# Patient Record
Sex: MALE | Race: WHITE | Employment: FULL TIME | URBAN - METROPOLITAN AREA
[De-identification: names, ages, dates, MRNs, and addresses within clinical notes are randomized per-mention and may not be internally consistent; named-entity substitution may affect disease eponyms.]

---

## 2017-01-31 ENCOUNTER — OFFICE VISIT (OUTPATIENT)
Dept: FAMILY MEDICINE CLINIC | Age: 27
End: 2017-01-31

## 2017-01-31 VITALS
BODY MASS INDEX: 25.95 KG/M2 | DIASTOLIC BLOOD PRESSURE: 64 MMHG | OXYGEN SATURATION: 99 % | WEIGHT: 194 LBS | HEART RATE: 71 BPM | SYSTOLIC BLOOD PRESSURE: 124 MMHG

## 2017-01-31 DIAGNOSIS — F32.A DEPRESSION, UNSPECIFIED DEPRESSION TYPE: Primary | ICD-10-CM

## 2017-01-31 PROCEDURE — 99214 OFFICE O/P EST MOD 30 MIN: CPT | Performed by: FAMILY MEDICINE

## 2017-01-31 RX ORDER — FLUOXETINE HYDROCHLORIDE 20 MG/1
20 CAPSULE ORAL DAILY
Qty: 30 CAPSULE | Refills: 3 | Status: SHIPPED | OUTPATIENT
Start: 2017-01-31 | End: 2018-03-20

## 2017-02-06 ENCOUNTER — OFFICE VISIT (OUTPATIENT)
Dept: PSYCHOLOGY | Age: 27
End: 2017-02-06

## 2017-02-06 DIAGNOSIS — F32.A DEPRESSION, UNSPECIFIED DEPRESSION TYPE: ICD-10-CM

## 2017-02-06 DIAGNOSIS — F41.9 ANXIETY: Primary | ICD-10-CM

## 2017-02-06 PROCEDURE — 90791 PSYCH DIAGNOSTIC EVALUATION: CPT | Performed by: PSYCHOLOGIST

## 2017-02-06 ASSESSMENT — PATIENT HEALTH QUESTIONNAIRE - PHQ9
SUM OF ALL RESPONSES TO PHQ QUESTIONS 1-9: 13
SUM OF ALL RESPONSES TO PHQ9 QUESTIONS 1 & 2: 6
5. POOR APPETITE OR OVEREATING: 0
6. FEELING BAD ABOUT YOURSELF - OR THAT YOU ARE A FAILURE OR HAVE LET YOURSELF OR YOUR FAMILY DOWN: 1
2. FEELING DOWN, DEPRESSED OR HOPELESS: 3
3. TROUBLE FALLING OR STAYING ASLEEP: 0
8. MOVING OR SPEAKING SO SLOWLY THAT OTHER PEOPLE COULD HAVE NOTICED. OR THE OPPOSITE, BEING SO FIGETY OR RESTLESS THAT YOU HAVE BEEN MOVING AROUND A LOT MORE THAN USUAL: 0
9. THOUGHTS THAT YOU WOULD BE BETTER OFF DEAD, OR OF HURTING YOURSELF: 0
1. LITTLE INTEREST OR PLEASURE IN DOING THINGS: 3
7. TROUBLE CONCENTRATING ON THINGS, SUCH AS READING THE NEWSPAPER OR WATCHING TELEVISION: 3
4. FEELING TIRED OR HAVING LITTLE ENERGY: 3

## 2017-02-20 ENCOUNTER — OFFICE VISIT (OUTPATIENT)
Dept: PSYCHOLOGY | Age: 27
End: 2017-02-20

## 2017-02-20 DIAGNOSIS — F32.A DEPRESSION, UNSPECIFIED DEPRESSION TYPE: Primary | ICD-10-CM

## 2017-02-20 DIAGNOSIS — F41.9 ANXIETY: ICD-10-CM

## 2017-02-20 PROCEDURE — 90832 PSYTX W PT 30 MINUTES: CPT | Performed by: PSYCHOLOGIST

## 2017-03-01 ENCOUNTER — TELEPHONE (OUTPATIENT)
Dept: FAMILY MEDICINE CLINIC | Age: 27
End: 2017-03-01

## 2017-03-08 ENCOUNTER — OFFICE VISIT (OUTPATIENT)
Dept: PSYCHOLOGY | Age: 27
End: 2017-03-08

## 2017-03-08 DIAGNOSIS — F41.9 ANXIETY: ICD-10-CM

## 2017-03-08 DIAGNOSIS — F32.A DEPRESSION, UNSPECIFIED DEPRESSION TYPE: Primary | ICD-10-CM

## 2017-03-08 PROCEDURE — 90832 PSYTX W PT 30 MINUTES: CPT | Performed by: PSYCHOLOGIST

## 2017-03-08 ASSESSMENT — PATIENT HEALTH QUESTIONNAIRE - PHQ9
8. MOVING OR SPEAKING SO SLOWLY THAT OTHER PEOPLE COULD HAVE NOTICED. OR THE OPPOSITE, BEING SO FIGETY OR RESTLESS THAT YOU HAVE BEEN MOVING AROUND A LOT MORE THAN USUAL: 0
6. FEELING BAD ABOUT YOURSELF - OR THAT YOU ARE A FAILURE OR HAVE LET YOURSELF OR YOUR FAMILY DOWN: 0
4. FEELING TIRED OR HAVING LITTLE ENERGY: 1
2. FEELING DOWN, DEPRESSED OR HOPELESS: 0
SUM OF ALL RESPONSES TO PHQ9 QUESTIONS 1 & 2: 0
5. POOR APPETITE OR OVEREATING: 0
1. LITTLE INTEREST OR PLEASURE IN DOING THINGS: 0
9. THOUGHTS THAT YOU WOULD BE BETTER OFF DEAD, OR OF HURTING YOURSELF: 0
7. TROUBLE CONCENTRATING ON THINGS, SUCH AS READING THE NEWSPAPER OR WATCHING TELEVISION: 1
3. TROUBLE FALLING OR STAYING ASLEEP: 1
SUM OF ALL RESPONSES TO PHQ QUESTIONS 1-9: 3

## 2018-03-20 ENCOUNTER — OFFICE VISIT (OUTPATIENT)
Dept: FAMILY MEDICINE CLINIC | Age: 28
End: 2018-03-20

## 2018-03-20 VITALS
HEIGHT: 73 IN | OXYGEN SATURATION: 99 % | BODY MASS INDEX: 25.31 KG/M2 | WEIGHT: 191 LBS | DIASTOLIC BLOOD PRESSURE: 54 MMHG | HEART RATE: 65 BPM | SYSTOLIC BLOOD PRESSURE: 100 MMHG

## 2018-03-20 DIAGNOSIS — F41.9 ANXIETY: Primary | ICD-10-CM

## 2018-03-20 PROCEDURE — 99213 OFFICE O/P EST LOW 20 MIN: CPT | Performed by: FAMILY MEDICINE

## 2018-03-20 RX ORDER — ESCITALOPRAM OXALATE 10 MG/1
10 TABLET ORAL DAILY
Qty: 30 TABLET | Refills: 3 | Status: SHIPPED | OUTPATIENT
Start: 2018-03-20 | End: 2018-05-30 | Stop reason: ALTCHOICE

## 2018-05-30 ENCOUNTER — HOSPITAL ENCOUNTER (OUTPATIENT)
Dept: OTHER | Age: 28
Discharge: HOME | End: 2018-05-31
Attending: FAMILY MEDICINE | Admitting: FAMILY MEDICINE

## 2018-05-30 ENCOUNTER — HOSPITAL ENCOUNTER (OUTPATIENT)
Dept: GENERAL RADIOLOGY | Age: 28
Discharge: HOME | End: 2018-05-30

## 2018-05-30 ENCOUNTER — OFFICE VISIT (OUTPATIENT)
Dept: FAMILY MEDICINE CLINIC | Age: 28
End: 2018-05-30

## 2018-05-30 VITALS
OXYGEN SATURATION: 99 % | HEART RATE: 95 BPM | RESPIRATION RATE: 16 BRPM | SYSTOLIC BLOOD PRESSURE: 104 MMHG | DIASTOLIC BLOOD PRESSURE: 70 MMHG | WEIGHT: 189 LBS | HEIGHT: 73 IN | BODY MASS INDEX: 25.05 KG/M2

## 2018-05-30 DIAGNOSIS — F41.0 PANIC ATTACKS: ICD-10-CM

## 2018-05-30 DIAGNOSIS — R07.9 CHEST PAIN, UNSPECIFIED TYPE: ICD-10-CM

## 2018-05-30 DIAGNOSIS — R07.9 CHEST PAIN, UNSPECIFIED TYPE: Primary | ICD-10-CM

## 2018-05-30 PROCEDURE — 93000 ELECTROCARDIOGRAM COMPLETE: CPT | Performed by: FAMILY MEDICINE

## 2018-05-30 PROCEDURE — 99214 OFFICE O/P EST MOD 30 MIN: CPT | Performed by: FAMILY MEDICINE

## 2018-05-30 RX ORDER — HYDROXYZINE HYDROCHLORIDE 10 MG/1
10 TABLET, FILM COATED ORAL 3 TIMES DAILY PRN
Qty: 30 TABLET | Refills: 0 | Status: SHIPPED | OUTPATIENT
Start: 2018-05-30 | End: 2018-06-09

## 2018-05-30 ASSESSMENT — PATIENT HEALTH QUESTIONNAIRE - PHQ9
SUM OF ALL RESPONSES TO PHQ9 QUESTIONS 1 & 2: 0
1. LITTLE INTEREST OR PLEASURE IN DOING THINGS: 0
SUM OF ALL RESPONSES TO PHQ QUESTIONS 1-9: 0
2. FEELING DOWN, DEPRESSED OR HOPELESS: 0

## 2018-05-30 ASSESSMENT — ENCOUNTER SYMPTOMS: SHORTNESS OF BREATH: 1

## 2018-10-31 ENCOUNTER — OFFICE VISIT (OUTPATIENT)
Dept: FAMILY MEDICINE CLINIC | Age: 28
End: 2018-10-31
Payer: COMMERCIAL

## 2018-10-31 VITALS
SYSTOLIC BLOOD PRESSURE: 116 MMHG | HEART RATE: 77 BPM | OXYGEN SATURATION: 99 % | DIASTOLIC BLOOD PRESSURE: 78 MMHG | WEIGHT: 186 LBS | BODY MASS INDEX: 24.88 KG/M2

## 2018-10-31 DIAGNOSIS — F41.9 ANXIETY: Primary | ICD-10-CM

## 2018-10-31 DIAGNOSIS — G47.9 SLEEP DISORDER: ICD-10-CM

## 2018-10-31 DIAGNOSIS — Z23 NEED FOR PROPHYLACTIC VACCINATION AND INOCULATION AGAINST INFLUENZA: ICD-10-CM

## 2018-10-31 PROCEDURE — 90471 IMMUNIZATION ADMIN: CPT | Performed by: FAMILY MEDICINE

## 2018-10-31 PROCEDURE — 99214 OFFICE O/P EST MOD 30 MIN: CPT | Performed by: FAMILY MEDICINE

## 2018-10-31 PROCEDURE — 90686 IIV4 VACC NO PRSV 0.5 ML IM: CPT | Performed by: FAMILY MEDICINE

## 2018-10-31 RX ORDER — ESCITALOPRAM OXALATE 10 MG/1
10 TABLET ORAL DAILY
Qty: 30 TABLET | Refills: 3 | Status: SHIPPED | OUTPATIENT
Start: 2018-10-31 | End: 2019-02-12 | Stop reason: SDUPTHER

## 2018-11-06 ENCOUNTER — OFFICE VISIT (OUTPATIENT)
Dept: PULMONOLOGY | Age: 28
End: 2018-11-06
Payer: COMMERCIAL

## 2018-11-06 VITALS
OXYGEN SATURATION: 98 % | TEMPERATURE: 97.9 F | HEIGHT: 73 IN | DIASTOLIC BLOOD PRESSURE: 59 MMHG | BODY MASS INDEX: 24.52 KG/M2 | WEIGHT: 185 LBS | HEART RATE: 66 BPM | RESPIRATION RATE: 16 BRPM | SYSTOLIC BLOOD PRESSURE: 95 MMHG

## 2018-11-06 DIAGNOSIS — G47.33 OSA (OBSTRUCTIVE SLEEP APNEA): Primary | ICD-10-CM

## 2018-11-06 DIAGNOSIS — G47.8 SLEEP PARALYSIS: ICD-10-CM

## 2018-11-06 DIAGNOSIS — G47.19 EXCESSIVE DAYTIME SLEEPINESS: ICD-10-CM

## 2018-11-06 PROCEDURE — 99244 OFF/OP CNSLTJ NEW/EST MOD 40: CPT | Performed by: INTERNAL MEDICINE

## 2018-11-06 ASSESSMENT — SLEEP AND FATIGUE QUESTIONNAIRES
HOW LIKELY ARE YOU TO NOD OFF OR FALL ASLEEP WHILE SITTING AND TALKING TO SOMEONE: 0
NECK CIRCUMFERENCE (INCHES): 15
HOW LIKELY ARE YOU TO NOD OFF OR FALL ASLEEP WHEN YOU ARE A PASSENGER IN A CAR FOR AN HOUR WITHOUT A BREAK: 1
HOW LIKELY ARE YOU TO NOD OFF OR FALL ASLEEP WHILE LYING DOWN TO REST IN THE AFTERNOON WHEN CIRCUMSTANCES PERMIT: 3
HOW LIKELY ARE YOU TO NOD OFF OR FALL ASLEEP IN A CAR, WHILE STOPPED FOR A FEW MINUTES IN TRAFFIC: 0
HOW LIKELY ARE YOU TO NOD OFF OR FALL ASLEEP WHILE SITTING INACTIVE IN A PUBLIC PLACE: 1
HOW LIKELY ARE YOU TO NOD OFF OR FALL ASLEEP WHILE SITTING AND READING: 3
ESS TOTAL SCORE: 13
HOW LIKELY ARE YOU TO NOD OFF OR FALL ASLEEP WHILE SITTING QUIETLY AFTER LUNCH WITHOUT ALCOHOL: 2
HOW LIKELY ARE YOU TO NOD OFF OR FALL ASLEEP WHILE WATCHING TV: 3

## 2018-11-06 NOTE — PROGRESS NOTES
injection. ENT: No discharge. Pharynx clear. External appearance of ears and nose normal. Mallampati II, relatively  Neck: Trachea midline. No obvious mass. No JVD  Resp: No accessory muscle use. No crackles. No wheezes. No rhonchi. CV: Regular rate. Regular rhythm. No murmur or rub. No edema. GI: Non-tender. Non-distended. Skin: Warm, dry, normal texture and turgor. No nodule on exposed extremities. Lymph: No cervical LAD. No supraclavicular LAD. M/S: No cyanosis. No clubbing. No joint deformity. Neuro: Moves all four extremities. Psych: Oriented x 3. No anxiety. Awake. Alert. Intact judgement and insight. Current Outpatient Prescriptions   Medication Sig Dispense Refill    escitalopram (LEXAPRO) 10 MG tablet Take 1 tablet by mouth daily 30 tablet 3     No current facility-administered medications for this visit. Data Reviewed:   CBC and Renal reviewed  Last CBC  Lab Results   Component Value Date    WBC 6.2 09/09/2015    RBC 5.15 09/09/2015    HGB 15.2 09/09/2015    MCV 87.2 09/09/2015     09/09/2015     Last Renal  Lab Results   Component Value Date     08/11/2016    K 4.3 08/11/2016    CL 99 08/11/2016    CO2 24 08/11/2016    CO2 28 09/22/2015    CO2 24 02/26/2015    BUN 13 08/11/2016    CREATININE 1.0 08/11/2016    GLUCOSE 93 08/11/2016    CALCIUM 9.4 08/11/2016         Assessment:     · CRISTIAN  · Excessive daytime sleepiness  · Sleep paralysis    Plan:      1. CRISTIAN (obstructive sleep apnea)  Given snoring, observed apneas and waking up with a startle, he could have CRISTIAN. I would like to start with a home sleep study, explained the rationale to the patient. - Home Sleep Study; Future    2. Excessive daytime sleepiness  Probably related to CRISTIAN, cannot rule out narcolepsy. Await results of home sleep study. 3. Sleep paralysis  Long-standing, intermittent. This likely to represent narcolepsy, but if sleep study is negative for CRISTIAN, he will need a NPSG followed by MSLT.

## 2018-11-06 NOTE — PATIENT INSTRUCTIONS
Please  sleep machine at [facility]ULT[facility] on 11/12/2018 around 9 am.    [address] 13 2351 [facility] 22[facility], 5401 [facility]   [facility] [phone] [address] [facility] [address] patient ([address]) [phone]  [address], [address]   [facility]       ([address]) [phone] 107 [facility]  [place], [address]   ATI Cpap [phone] [facility]  [facility]   [address] [phone] [address], [address]   [address] [phone] or  [address], [address]   [facility] 1025 [facility]. [facility]   [place]  ([address]) [phone] or  [address], 4462 [facility] 1520 [facility]  ([address]) 1229 C [facility], [facility] Will 10   [facility] [phone] opt4 opt2    [facility] [phone]    IEWWZBH MWOAKWJTFB [phone] [facility] 43, [address] [facility] [address] [place] [phone] [address] [place] [address] 27   [address] [phone] [facility], [address] [facility] [address]   [facility] [place] [phone] [address] [place]   [address]  ([address] [phone]  1102 [facility] [address], [address]   515 [facility]  ([address]) [phone] [address]   1102 [facility], [facility] [phone] [address], [address]   [address]  (No Cpap machines) [facility], [facility]   [place] ([place] [address] place Ins) [phone] [address] [place] [address].   [place], [address]   [facility] [phone] [address] [phone] 5165 Paul [facility], [facility]0 [facility] [phone] [address] [place] [address] [facility] [address], [address] Respiratory [phone] 1256 [facility], [address] [facility] [phone] 69029 [facility],1St Floor  [place], [address], [address] ([address]) [phone] [address]  [place], [address]   [address]   [phone] 3788 [facility] [phone] 6000 [facility], [address]   [facility] (used to be Pt. [facility]) [phone] [address]  [facility], [address] [place] [address]   Patient Hardy Nicanor [phone] 5940 [facility].  [place] [place], [address]   701 [facility]  Respiratory [phone] 1668 [facility], [address] [place] [address]

## 2018-11-06 NOTE — LETTER
1200 [facility] Pulmonary Critical Care and Sleep  [address] [place] [address] Alf Yap  Phone: [phone]  Fax: [phone]    Sherine An MD        November 6, 2018       Patient: Umberto Garcia   MR Number: [id number]   YOB: 1990   Date of Visit: 11/6/2018       Dear Dr. Sweeney Ro: Thank you for the request for consultation for Christal Orozco to me for the evaluation of possible CRISTIAN. Below are the relevant portions of my assessment and plan of care. If you have questions, please do not hesitate to call me. I look forward to following Alejo Miranda along with you. Sincerely,        Ryan Bloch, MD    CC providers:  Canelo Johnson MD  5721 [facility].   [address] [place] [address]

## 2018-11-15 ENCOUNTER — OFFICE VISIT (OUTPATIENT)
Dept: FAMILY MEDICINE CLINIC | Age: 28
End: 2018-11-15
Payer: COMMERCIAL

## 2018-11-15 VITALS
BODY MASS INDEX: 25.21 KG/M2 | HEART RATE: 67 BPM | OXYGEN SATURATION: 98 % | DIASTOLIC BLOOD PRESSURE: 70 MMHG | WEIGHT: 190.2 LBS | SYSTOLIC BLOOD PRESSURE: 110 MMHG | HEIGHT: 73 IN | RESPIRATION RATE: 16 BRPM

## 2018-11-15 DIAGNOSIS — K21.9 GASTROESOPHAGEAL REFLUX DISEASE, ESOPHAGITIS PRESENCE NOT SPECIFIED: ICD-10-CM

## 2018-11-15 DIAGNOSIS — R30.0 BURNING WITH URINATION: Primary | ICD-10-CM

## 2018-11-15 LAB
BILIRUBIN URINE: NEGATIVE
BILIRUBIN, POC: NEGATIVE
BLOOD URINE, POC: NORMAL
BLOOD, URINE: ABNORMAL
CLARITY, POC: CLEAR
CLARITY: CLEAR
COLOR, POC: YELLOW
COLOR: YELLOW
EPITHELIAL CELLS, UA: 0 /HPF (ref 0–5)
GLUCOSE URINE, POC: NEGATIVE
GLUCOSE URINE: NEGATIVE MG/DL
HYALINE CASTS: 0 /LPF (ref 0–8)
KETONES, POC: NEGATIVE
KETONES, URINE: NEGATIVE MG/DL
LEUKOCYTE EST, POC: NEGATIVE
LEUKOCYTE ESTERASE, URINE: NEGATIVE
MICROSCOPIC EXAMINATION: YES
NITRITE, POC: NEGATIVE
NITRITE, URINE: NEGATIVE
PH UA: 6.5
PH, POC: 6.5
PROTEIN UA: NEGATIVE MG/DL
PROTEIN, POC: NEGATIVE
RBC UA: 1 /HPF (ref 0–4)
SPECIFIC GRAVITY UA: 1.01
SPECIFIC GRAVITY, POC: 1.01
URINE REFLEX TO CULTURE: ABNORMAL
URINE TYPE: ABNORMAL
UROBILINOGEN, POC: 0.2
UROBILINOGEN, URINE: 0.2 E.U./DL
WBC UA: 0 /HPF (ref 0–5)

## 2018-11-15 PROCEDURE — 99214 OFFICE O/P EST MOD 30 MIN: CPT | Performed by: NURSE PRACTITIONER

## 2018-11-15 PROCEDURE — 81003 URINALYSIS AUTO W/O SCOPE: CPT | Performed by: NURSE PRACTITIONER

## 2018-11-15 PROCEDURE — 81002 URINALYSIS NONAUTO W/O SCOPE: CPT | Performed by: NURSE PRACTITIONER

## 2018-11-15 RX ORDER — OMEPRAZOLE 20 MG/1
20 CAPSULE, DELAYED RELEASE ORAL DAILY
Qty: 30 CAPSULE | Refills: 1 | Status: SHIPPED | OUTPATIENT
Start: 2018-11-15 | End: 2019-04-13 | Stop reason: ALTCHOICE

## 2018-11-15 ASSESSMENT — ENCOUNTER SYMPTOMS
SHORTNESS OF BREATH: 0
COUGH: 0

## 2018-11-21 ENCOUNTER — TELEPHONE (OUTPATIENT)
Dept: PULMONOLOGY | Age: 28
End: 2018-11-21

## 2018-11-30 ENCOUNTER — TELEPHONE (OUTPATIENT)
Dept: PULMONOLOGY | Age: 28
End: 2018-11-30

## 2019-02-12 ENCOUNTER — OFFICE VISIT (OUTPATIENT)
Dept: FAMILY MEDICINE CLINIC | Age: 29
End: 2019-02-12
Payer: COMMERCIAL

## 2019-02-12 VITALS
SYSTOLIC BLOOD PRESSURE: 100 MMHG | TEMPERATURE: 98.4 F | WEIGHT: 195 LBS | DIASTOLIC BLOOD PRESSURE: 60 MMHG | BODY MASS INDEX: 25.73 KG/M2 | HEART RATE: 80 BPM

## 2019-02-12 DIAGNOSIS — F41.9 ANXIETY: ICD-10-CM

## 2019-02-12 DIAGNOSIS — H92.01 RIGHT EAR PAIN: Primary | ICD-10-CM

## 2019-02-12 PROCEDURE — G0444 DEPRESSION SCREEN ANNUAL: HCPCS | Performed by: NURSE PRACTITIONER

## 2019-02-12 PROCEDURE — 99213 OFFICE O/P EST LOW 20 MIN: CPT | Performed by: NURSE PRACTITIONER

## 2019-02-12 RX ORDER — ESCITALOPRAM OXALATE 10 MG/1
10 TABLET ORAL DAILY
Qty: 30 TABLET | Refills: 3 | Status: ON HOLD | OUTPATIENT
Start: 2019-02-12 | End: 2019-04-15 | Stop reason: HOSPADM

## 2019-02-12 RX ORDER — FLUTICASONE PROPIONATE 50 MCG
1 SPRAY, SUSPENSION (ML) NASAL DAILY
Qty: 2 BOTTLE | Refills: 1 | Status: SHIPPED | OUTPATIENT
Start: 2019-02-12 | End: 2019-04-13 | Stop reason: ALTCHOICE

## 2019-02-12 ASSESSMENT — PATIENT HEALTH QUESTIONNAIRE - PHQ9
4. FEELING TIRED OR HAVING LITTLE ENERGY: 3
7. TROUBLE CONCENTRATING ON THINGS, SUCH AS READING THE NEWSPAPER OR WATCHING TELEVISION: 2
3. TROUBLE FALLING OR STAYING ASLEEP: 3
1. LITTLE INTEREST OR PLEASURE IN DOING THINGS: 2
SUM OF ALL RESPONSES TO PHQ QUESTIONS 1-9: 18
5. POOR APPETITE OR OVEREATING: 2
10. IF YOU CHECKED OFF ANY PROBLEMS, HOW DIFFICULT HAVE THESE PROBLEMS MADE IT FOR YOU TO DO YOUR WORK, TAKE CARE OF THINGS AT HOME, OR GET ALONG WITH OTHER PEOPLE: 3
6. FEELING BAD ABOUT YOURSELF - OR THAT YOU ARE A FAILURE OR HAVE LET YOURSELF OR YOUR FAMILY DOWN: 3
2. FEELING DOWN, DEPRESSED OR HOPELESS: 3
8. MOVING OR SPEAKING SO SLOWLY THAT OTHER PEOPLE COULD HAVE NOTICED. OR THE OPPOSITE, BEING SO FIGETY OR RESTLESS THAT YOU HAVE BEEN MOVING AROUND A LOT MORE THAN USUAL: 0
SUM OF ALL RESPONSES TO PHQ QUESTIONS 1-9: 18
9. THOUGHTS THAT YOU WOULD BE BETTER OFF DEAD, OR OF HURTING YOURSELF: 0
SUM OF ALL RESPONSES TO PHQ9 QUESTIONS 1 & 2: 5

## 2019-02-12 ASSESSMENT — ENCOUNTER SYMPTOMS
SINUS PAIN: 0
SINUS PRESSURE: 0
VOICE CHANGE: 0
GASTROINTESTINAL NEGATIVE: 1
RESPIRATORY NEGATIVE: 1

## 2019-04-13 ENCOUNTER — APPOINTMENT (OUTPATIENT)
Dept: GENERAL RADIOLOGY | Age: 29
DRG: 885 | End: 2019-04-13
Payer: COMMERCIAL

## 2019-04-13 ENCOUNTER — HOSPITAL ENCOUNTER (INPATIENT)
Age: 29
LOS: 2 days | Discharge: HOME | DRG: 885 | End: 2019-04-15
Attending: EMERGENCY MEDICINE | Admitting: PSYCHIATRY & NEUROLOGY
Payer: COMMERCIAL

## 2019-04-13 DIAGNOSIS — F39 MOOD DISORDER (HCC): Primary | ICD-10-CM

## 2019-04-13 DIAGNOSIS — F31.12 BIPOLAR I DISORDER, MOST RECENT EPISODE (OR CURRENT) MANIC, MODERATE (HCC): ICD-10-CM

## 2019-04-13 DIAGNOSIS — M25.572 ACUTE LEFT ANKLE PAIN: ICD-10-CM

## 2019-04-13 LAB
A/G RATIO: 2 (ref 1.1–2.2)
ALBUMIN SERPL-MCNC: 4.7 G/DL (ref 3.4–5)
ALP BLD-CCNC: 74 U/L (ref 40–129)
ALT SERPL-CCNC: 35 U/L (ref 10–40)
AMPHETAMINE SCREEN, URINE: NORMAL
ANION GAP SERPL CALCULATED.3IONS-SCNC: 12 MMOL/L (ref 3–16)
AST SERPL-CCNC: 50 U/L (ref 15–37)
BARBITURATE SCREEN URINE: NORMAL
BASOPHILS ABSOLUTE: 0.1 K/UL (ref 0–0.2)
BASOPHILS RELATIVE PERCENT: 0.6 %
BENZODIAZEPINE SCREEN, URINE: NORMAL
BILIRUB SERPL-MCNC: 0.8 MG/DL (ref 0–1)
BUN BLDV-MCNC: 14 MG/DL (ref 7–20)
CALCIUM SERPL-MCNC: 9.2 MG/DL (ref 8.3–10.6)
CANNABINOID SCREEN URINE: NORMAL
CHLORIDE BLD-SCNC: 100 MMOL/L (ref 99–110)
CO2: 26 MMOL/L (ref 21–32)
COCAINE METABOLITE SCREEN URINE: NORMAL
CREAT SERPL-MCNC: 1.1 MG/DL (ref 0.9–1.3)
EOSINOPHILS ABSOLUTE: 0 K/UL (ref 0–0.6)
EOSINOPHILS RELATIVE PERCENT: 0.3 %
ETHANOL: NORMAL MG/DL (ref 0–0.08)
GFR AFRICAN AMERICAN: >60
GFR NON-AFRICAN AMERICAN: >60
GLOBULIN: 2.4 G/DL
GLUCOSE BLD-MCNC: 112 MG/DL (ref 70–99)
HCT VFR BLD CALC: 44.3 % (ref 40.5–52.5)
HEMOGLOBIN: 14.9 G/DL (ref 13.5–17.5)
LYMPHOCYTES ABSOLUTE: 1.4 K/UL (ref 1–5.1)
LYMPHOCYTES RELATIVE PERCENT: 16.2 %
Lab: NORMAL
MCH RBC QN AUTO: 29.7 PG (ref 26–34)
MCHC RBC AUTO-ENTMCNC: 33.6 G/DL (ref 31–36)
MCV RBC AUTO: 88.2 FL (ref 80–100)
METHADONE SCREEN, URINE: NORMAL
MONOCYTES ABSOLUTE: 0.6 K/UL (ref 0–1.3)
MONOCYTES RELATIVE PERCENT: 7.2 %
NEUTROPHILS ABSOLUTE: 6.4 K/UL (ref 1.7–7.7)
NEUTROPHILS RELATIVE PERCENT: 75.7 %
OPIATE SCREEN URINE: NORMAL
OXYCODONE URINE: NORMAL
PDW BLD-RTO: 14.1 % (ref 12.4–15.4)
PH UA: 6
PHENCYCLIDINE SCREEN URINE: NORMAL
PLATELET # BLD: 293 K/UL (ref 135–450)
PMV BLD AUTO: 7.3 FL (ref 5–10.5)
POTASSIUM SERPL-SCNC: 3.8 MMOL/L (ref 3.5–5.1)
PROPOXYPHENE SCREEN: NORMAL
RBC # BLD: 5.02 M/UL (ref 4.2–5.9)
SODIUM BLD-SCNC: 138 MMOL/L (ref 136–145)
TOTAL PROTEIN: 7.1 G/DL (ref 6.4–8.2)
WBC # BLD: 8.5 K/UL (ref 4–11)

## 2019-04-13 PROCEDURE — 73610 X-RAY EXAM OF ANKLE: CPT

## 2019-04-13 PROCEDURE — 80307 DRUG TEST PRSMV CHEM ANLYZR: CPT

## 2019-04-13 PROCEDURE — 99285 EMERGENCY DEPT VISIT HI MDM: CPT

## 2019-04-13 PROCEDURE — G0480 DRUG TEST DEF 1-7 CLASSES: HCPCS

## 2019-04-13 PROCEDURE — 1240000000 HC EMOTIONAL WELLNESS R&B

## 2019-04-13 PROCEDURE — 80053 COMPREHEN METABOLIC PANEL: CPT

## 2019-04-13 PROCEDURE — 85025 COMPLETE CBC W/AUTO DIFF WBC: CPT

## 2019-04-13 RX ORDER — MAGNESIUM HYDROXIDE/ALUMINUM HYDROXICE/SIMETHICONE 120; 1200; 1200 MG/30ML; MG/30ML; MG/30ML
30 SUSPENSION ORAL EVERY 6 HOURS PRN
Status: DISCONTINUED | OUTPATIENT
Start: 2019-04-13 | End: 2019-04-15 | Stop reason: HOSPADM

## 2019-04-13 RX ORDER — TRAZODONE HYDROCHLORIDE 50 MG/1
50 TABLET ORAL NIGHTLY PRN
Status: DISCONTINUED | OUTPATIENT
Start: 2019-04-13 | End: 2019-04-15 | Stop reason: HOSPADM

## 2019-04-13 RX ORDER — NICOTINE 21 MG/24HR
1 PATCH, TRANSDERMAL 24 HOURS TRANSDERMAL DAILY PRN
Status: DISCONTINUED | OUTPATIENT
Start: 2019-04-13 | End: 2019-04-15 | Stop reason: HOSPADM

## 2019-04-13 RX ORDER — ACETAMINOPHEN 325 MG/1
650 TABLET ORAL EVERY 4 HOURS PRN
Status: DISCONTINUED | OUTPATIENT
Start: 2019-04-13 | End: 2019-04-15 | Stop reason: HOSPADM

## 2019-04-13 RX ORDER — HYDROXYZINE PAMOATE 50 MG/1
50 CAPSULE ORAL 3 TIMES DAILY PRN
Status: DISCONTINUED | OUTPATIENT
Start: 2019-04-13 | End: 2019-04-15 | Stop reason: HOSPADM

## 2019-04-13 RX ORDER — OLANZAPINE 5 MG/1
5 TABLET ORAL 2 TIMES DAILY PRN
Status: DISCONTINUED | OUTPATIENT
Start: 2019-04-13 | End: 2019-04-15 | Stop reason: HOSPADM

## 2019-04-13 ASSESSMENT — LIFESTYLE VARIABLES: HISTORY_ALCOHOL_USE: NO

## 2019-04-13 ASSESSMENT — SLEEP AND FATIGUE QUESTIONNAIRES
DIFFICULTY ARISING: NO
SLEEP PATTERN: DIFFICULTY FALLING ASLEEP;DISTURBED/INTERRUPTED SLEEP
DO YOU HAVE DIFFICULTY SLEEPING: YES
DIFFICULTY STAYING ASLEEP: YES
DIFFICULTY FALLING ASLEEP: YES
RESTFUL SLEEP: NO
DO YOU USE A SLEEP AID: NO
AVERAGE NUMBER OF SLEEP HOURS: 4

## 2019-04-13 NOTE — ED NOTES
Admission questions completed. Need physical assessment when arrives to unit. Need belongings checked and orientation.       Sandra Joe RN  04/13/19 2158

## 2019-04-13 NOTE — ED NOTES
Security called to take patient to [facility].      Sumi Reynaga RN  04/13/19 [phone]

## 2019-04-13 NOTE — ED NOTES
Met with patient's wife Golden Corona who accompanied patient per patient permission. Wife reports concerns about patient abnormal behaviors. Reports he is not himself. Reports patient started showing signs of depression about a month ago and started having fleeting suicidal thoughts with thoughts of sitting in running car. Reports he has no intent that he mentioned. Reports he seen his primary doctor and was started on Lexapro 10 mg. Reports past couple weeks patient thought he was doing better and than started becoming hypermanic like. Reports he out of no where went and ran 6 miles with out training. Reports he has not been sleeping and has a decreased appetite and has lost about 10 lbs in 2 weeks. Reports he has been going out after work and staying out all night which is completely not like him. Reports him to be flirting with other girls which is not in his character. Reports they have a daughter and another baby on way in few weeks. Reports he usually is the best dad ever and he has not been coming home to watch her. Reports daughter is recovering from surgery and has not been there for her like he normally would be. Reports he has been drinking when he goes out a couple drinks a could nights a week. Reports does not believe him to be using drugs. Reports they went to one marriage therapy appointment and therapist told him he should be evaluated for bipolar. Wife reports patient is working full time as a manager at [employer]. Reports he has had issues with jobs through the years. Reports he has had depression since he was a teenager. Reports no known suicide attempts in past. Reports no known violence in past. Reports she does not believe he would harm himself however is concerned about him making poor choices. Reports she feels staying would benefit him. Spoke to her about IOP/PHP. Wife reports she feels would benefit him as well. Reports she wants him to get help.      Ya Pulido, RN  04/13/19 1024

## 2019-04-13 NOTE — ED PROVIDER NOTES
[address] ED      CHIEF COMPLAINT  Mental Health Problem (feel hypermanic, alot of energy, unable to sleep, fleeting suicidal thoughts)       HISTORY OF PRESENT ILLNESS  Rama Turner is a 29 y.o. male  who presents to the ED complaining of concerns for abnormal behavior. Patient had been recently placed on Lexapro. He has been going out which is unusual for him and went for a long run spontaneously as well which is not his typical routine. He has had very fleeting suicidal thoughts but no current suicidal ideation. Patient also states that last night he went out does not recall what he may have done but his left ankle on the lateral aspect is hurting whenever he tries to bear weight. Patient denies any numbness, tingling or weakness. No pain in the knee or hip. No known fall or injury. No other complaints, modifying factors or associated symptoms. I have reviewed the following from the nursing documentation. 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 No pertinent surgical history.   Family History   Problem Relation Age of Onset    High Blood Pressure Father     Depression Father     Diabetes Maternal Grandfather     Stroke Maternal Grandfather     High Cholesterol Maternal Grandfather     Heart Disease Paternal Grandfather     Thyroid Disease Paternal Grandfather     Depression Paternal Grandfather     Thyroid Disease Maternal Aunt     Depression Paternal Aunt     Allergies Paternal Grandmother     Cancer Paternal Grandmother         Ovarian     Social History     Socioeconomic History    Marital status:      Spouse name: Not on file    Number of children: Not on file    Years of education: Not on file    Highest education level: Not on file   Occupational History    Not on file   Social Needs    Financial resource strain: Not on file    Food insecurity:     Worry: Not on file Inability: Not on file    Transportation needs:     Medical: Not on file     Non-medical: Not on file   Tobacco Use    Smoking status: Never Smoker    Smokeless tobacco: Never Used   Substance and Sexual Activity    Alcohol use: Yes     Comment: 1-2 drinks occasionally    Drug use: No    Sexual activity: Yes     Partners: Female   Lifestyle    Physical activity:     Days per week: Not on file     Minutes per session: Not on file    Stress: Not on file   Relationships    Social connections:     Talks on phone: Not on file     Gets together: Not on file     Attends Yarsanism service: Not on file     Active member of club or organization: Not on file     Attends meetings of clubs or organizations: Not on file     Relationship status: Not on file    Intimate partner violence:     Fear of current or ex partner: Not on file     Emotionally abused: Not on file     Physically abused: Not on file     Forced sexual activity: Not on file   Other Topics Concern    Not on file   Social History Narrative    Not on file     No current facility-administered medications for this encounter. Current Outpatient Medications   Medication Sig Dispense Refill    escitalopram (LEXAPRO) 10 MG tablet Take 1 tablet by mouth daily 30 tablet 3     No Known Allergies    REVIEW OF SYSTEMS  10 systems reviewed, pertinent positives per HPI otherwise noted to be negative. PHYSICAL EXAM  /84   Pulse 97   Temp 97.5 °F (36.4 °C) (Oral)   Resp 16   Ht 6' 1\" (1.854 m)   Wt 185 lb (83.9 kg)   SpO2 99%   BMI 24.41 kg/m²    GENERAL APPEARANCE: Awake and alert. Cooperative. No acute distress. HENT: Normocephalic. Atraumatic. Mucous membranes are moist.  No drooling or stridor. NECK: Supple. EYES: PERRL. EOM's grossly intact. HEART/CHEST: RRR. No murmurs. 2+ DP and PT pulses bilaterally. LUNGS: Respirations unlabored. CTAB. Good air exchange. Speaking comfortably in full sentences. ABDOMEN: No tenderness. Soft. Non-distended. No masses. No organomegaly. No guarding or rebound. MUSCULOSKELETAL: No extremity edema. Compartments soft. No deformity. Mild tenderness the left ankle just inferior to the lateral malleolus but no actual point tenderness over the medial or lateral malleoli. No significant soft tissue swelling, warmth or erythema. .  All extremities neurovascularly intact. SKIN: Warm and dry. No acute rashes. NEUROLOGICAL: Alert and oriented. CN's 2-12 intact. No gross facial drooping. Strength 5/5, sensation intact. PSYCHIATRIC: Normal mood and affect. LABS  I have reviewed all labs for this visit.    Results for orders placed or performed during the hospital encounter of 04/13/19   CBC auto differential   Result Value Ref Range    WBC 8.5 4.0 - 11.0 K/uL    RBC 5.02 4.20 - 5.90 M/uL    Hemoglobin 14.9 13.5 - 17.5 g/dL    Hematocrit 44.3 40.5 - 52.5 %    MCV 88.2 80.0 - 100.0 fL    MCH 29.7 26.0 - 34.0 pg    MCHC 33.6 31.0 - 36.0 g/dL    RDW 14.1 12.4 - 15.4 %    Platelets 112 808 - 605 K/uL    MPV 7.3 5.0 - 10.5 fL    Neutrophils % 75.7 %    Lymphocytes % 16.2 %    Monocytes % 7.2 %    Eosinophils % 0.3 %    Basophils % 0.6 %    Neutrophils # 6.4 1.7 - 7.7 K/uL    Lymphocytes # 1.4 1.0 - 5.1 K/uL    Monocytes # 0.6 0.0 - 1.3 K/uL    Eosinophils # 0.0 0.0 - 0.6 K/uL    Basophils # 0.1 0.0 - 0.2 K/uL   Comprehensive metabolic panel   Result Value Ref Range    Sodium 138 136 - 145 mmol/L    Potassium 3.8 3.5 - 5.1 mmol/L    Chloride 100 99 - 110 mmol/L    CO2 26 21 - 32 mmol/L    Anion Gap 12 3 - 16    Glucose 112 (H) 70 - 99 mg/dL    BUN 14 7 - 20 mg/dL    CREATININE 1.1 0.9 - 1.3 mg/dL    GFR Non-African American >60 >60    GFR African American >60 >60    Calcium 9.2 8.3 - 10.6 mg/dL    Total Protein 7.1 6.4 - 8.2 g/dL    Alb 4.7 3.4 - 5.0 g/dL    Albumin/Globulin Ratio 2.0 1.1 - 2.2    Total Bilirubin 0.8 0.0 - 1.0 mg/dL    Alkaline Phosphatase 74 40 - 129 U/L    ALT 35 10 - 40 U/L    AST 50 (H) 15 - 37 U/L    Globulin 2.4 g/dL   Drug screen multi urine   Result Value Ref Range    Amphetamine Screen, Urine Neg Negative <1000ng/mL    Barbiturate Screen, Ur Neg Negative <200 ng/mL    Benzodiazepine Screen, Urine Neg Negative <200 ng/mL    Cannabinoid Scrn, Ur Neg Negative <50 ng/mL    Cocaine Metabolite Screen, Urine Neg Negative <300 ng/mL    Opiate Scrn, Ur Neg Negative <300 ng/mL    PCP Screen, Urine Neg Negative <25 ng/mL    Methadone Screen, Urine Neg Negative <300 ng/mL    Propoxyphene Scrn, Ur Neg Negative <300 ng/mL    pH, UA 6.0     Drug Screen Comment: see below     Oxycodone Urine Neg Negative <100 ng/ml   Ethanol   Result Value Ref Range    Ethanol Lvl None Detected mg/dL       RADIOLOGY  Xr Ankle Left (min 3 Views)    Result Date: 4/13/2019  EXAMINATION: 3 XRAY VIEWS OF THE LEFT ANKLE 4/13/2019 10:33 am COMPARISON: None. HISTORY: ORDERING SYSTEM PROVIDED HISTORY: pain with walking, no known injury. pain is lateral TECHNOLOGIST PROVIDED HISTORY: Reason for exam:->pain with walking, no known injury. pain is lateral Ordering Physician Provided Reason for Exam: pain in lateral malleolus Acuity: Acute Type of Exam: Initial FINDINGS: Talar dome normal in contour. Ankle mortise is preserved. No evidence of acute fracture or malalignment. No acute osseous abnormality. ED COURSE/MDM  Patient seen and evaluated. Old records reviewed. Labs and imaging reviewed and results discussed with patient. Patient presenting for evaluation of unusual behaviors that are spontaneous and excessive. He also is complaining of left ankle pain. He is neurovascular intact. Plain films negative for any acute fracture. I have performed a medical clearance examination on this patient. It is my opinion that no medical conditions were discovered that would preclude admission to a behavioral health unit or discharge home.   I feel that the patient is medically stable for disposition by the behavioral health team at this time. At this time patient has been evaluated by the behavioral access team and has offered voluntary admission to the behavioral unit versus PHP/IOP. At this time patient would like to be admitted voluntarily and will be admitted for further behavioral management at this time. During the patient's ED course, the patient was given:  Medications - No data to display     CLINICAL IMPRESSION  1. Mood disorder ([address]    2. Acute left ankle pain        Blood pressure 124/84, pulse 97, temperature 97.5 °F (36.4 °C), temperature source Oral, resp. rate 16, height 6' 1\" (1.854 m), weight 185 lb (83.9 kg), SpO2 99 %. [address] 649 V was admitted in stable condition. DISCLAIMER: This chart was created using Dragon dictation software. Efforts were made by me to ensure accuracy, however some errors may be present due to limitations of this technology and occasionally words are not transcribed correctly.         Chapin Rodriguez MD  04/13/19 8702

## 2019-04-13 NOTE — ED NOTES
Pt arrived ambulatory to [facility] with ED tech. Pt cooperative with belongings exchange, urine specimen collection and vitals collection. Pt belongings placed in locker and pt roomed in B3. No further needs at this time. Will continue to monitor for safety.     JUD Priest

## 2019-04-13 NOTE — BH NOTE
`[facility] [place]  Admission Note     Admission Type:   Admission Type: Voluntary    Reason for admission:  Reason for Admission: Suicidal thoughts, depresssion, trouble sleeping, decreased appetite,weight loss    PATIENT STRENGTHS:  Strengths: Communication, Positive Support, Social Skills, Employment, Medication Compliance, No significant Physical Illness    Patient Strengths and Limitations:       Addictive Behavior:   Addictive Behavior  In the past 3 months, have you felt or has someone told you that you have a problem with:  : None  Do you have a history of Chemical Use?: No  Do you have a history of Alcohol Use?: No  Do you have a history of Street Drug Abuse?: No  Histroy of Prescripton Drug Abuse?: No    Medical Problems:   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    Panic attack        Status EXAM:  Status and Exam  Facial Expression: Sad  Affect: Stable  Level of Consciousness: Alert  Mood:Normal: No  Mood: Depressed  Motor Activity:Normal: Yes(currently)  Interview Behavior: Cooperative  Preception: Dugger to Person, Karlynn Hailey to Time, Dugger to Place, Dugger to Situation  Attention:Normal: Yes  Thought Content:Normal: Yes  Hallucinations: None  Delusions: No  Memory:Normal: Yes  Insight and Judgment: No  Insight and Judgment: Poor Judgment  Present Suicidal Ideation: Yes(fleeting)  Present Homicidal Ideation: No    Tobacco Screening:  Practical Counseling, on admission, trish X, if applicable and completed (first 3 are required if patient doesn't refuse):            ( )  Recognizing danger situations (included triggers and roadblocks)                    ( )  Coping skills (new ways to manage stress, exercise, relaxation techniques, changing routine, distraction)                                                           ( )  Basic information about quitting (benefits of quitting, techniques in how to quit, available resources  ( ) Referral for counseling faxed to [facility]                                           ( ) Patient refused counseling  (X ) Patient has not smoked in the last 30 days    Metabolic Screening:    Lab Results   Component Value Date    LABA1C 5.2 08/11/2016       Lab Results   Component Value Date    CHOL 133 08/11/2016     Lab Results   Component Value Date    TRIG 52 08/11/2016     Lab Results   Component Value Date    HDL 46 08/11/2016     No components found for: [facility]  Lab Results   Component Value Date    LABVLDL 10 08/11/2016         Body mass index is 24.41 kg/m². BP Readings from Last 2 Encounters:   04/13/19 131/78   02/12/19 100/60           Pt admitted with followings belongings:  Dentures: None  Vision - Corrective Lenses: None  Hearing Aid: None  Jewelry: None  Body Piercings Removed: N/A  Clothing: Footwear, Jacket / coat  Were All Patient Medications Collected?: Not Applicable  Other Valuables: Mati Found, Cell phone(multiple debit cards in wallet, belt )     Patient oriented to surroundings and program expectations and copy of patient rights given. Received admission packet:  YES. Consents reviewed, signed YES. Patient verbalize understanding:  YES.     Patient education on precautions: Cyndie Wells RN

## 2019-04-14 PROCEDURE — 99222 1ST HOSP IP/OBS MODERATE 55: CPT | Performed by: PHYSICIAN ASSISTANT

## 2019-04-14 PROCEDURE — 90792 PSYCH DIAG EVAL W/MED SRVCS: CPT | Performed by: NURSE PRACTITIONER

## 2019-04-14 PROCEDURE — 6370000000 HC RX 637 (ALT 250 FOR IP): Performed by: NURSE PRACTITIONER

## 2019-04-14 PROCEDURE — 1240000000 HC EMOTIONAL WELLNESS R&B

## 2019-04-14 RX ORDER — LAMOTRIGINE 25 MG/1
50 TABLET ORAL NIGHTLY
Status: DISCONTINUED | OUTPATIENT
Start: 2019-04-14 | End: 2019-04-15 | Stop reason: HOSPADM

## 2019-04-14 RX ORDER — IBUPROFEN 400 MG/1
400 TABLET ORAL EVERY 6 HOURS PRN
Status: DISCONTINUED | OUTPATIENT
Start: 2019-04-14 | End: 2019-04-15 | Stop reason: HOSPADM

## 2019-04-14 RX ADMIN — LAMOTRIGINE 50 MG: 25 TABLET ORAL at 22:04

## 2019-04-14 ASSESSMENT — SLEEP AND FATIGUE QUESTIONNAIRES
DO YOU HAVE DIFFICULTY SLEEPING: YES
RESTFUL SLEEP: YES
DIFFICULTY STAYING ASLEEP: NO
DO YOU USE A SLEEP AID: NO
DIFFICULTY ARISING: NO
AVERAGE NUMBER OF SLEEP HOURS: 5
DIFFICULTY FALLING ASLEEP: NO

## 2019-04-14 ASSESSMENT — LIFESTYLE VARIABLES: HISTORY_ALCOHOL_USE: NO

## 2019-04-14 NOTE — H&P
Constitutional: Negative for fever   HENT: Negative for sore throat   Eyes: Negative for redness   Respiratory: Negative  for dyspnea, cough   Cardiovascular: Negative for chest pain   Gastrointestinal: Negative for vomiting, diarrhea   Genitourinary: Negative for hematuria   Musculoskeletal: Negative for arthralgias   Skin: Negative for rash   Neurological: Negative for syncope   Hematological: Negative for adenopathy   Psychiatric/Behavorial: Negative for anxiety    PHYSICAL EXAM:    /89   Pulse 89   Temp 98.6 °F (37 °C) (Oral)   Resp 16   Ht 6' 1\" (1.854 m)   Wt 185 lb (83.9 kg)   SpO2 99%   BMI 24.41 kg/m²   Gen: No distress. Alert. Pleasant  male  Eyes: PERRL. No sclera icterus. No conjunctival injection. ENT: No discharge. Pharynx clear. Neck:  Trachea midline. Resp: No accessory muscle use. No crackles. No wheezes. No rhonchi. CV: Regular rate. Regular rhythm. No murmur. No rub. No edema. GI: Non-tender. Non-distended. Normal bowel sounds. No hernia. Skin: Warm and dry. No rash on exposed extremities. M/S: No cyanosis. No clubbing. Neuro: Awake. Grossly nonfocal, CN II-XII intact    Psych: Oriented x 3. No anxiety or agitation.      CBC:   Recent Labs     04/13/19  0900   WBC 8.5   HGB 14.9   HCT 44.3   MCV 88.2        BMP:   Recent Labs     04/13/19  0900      K 3.8      CO2 26   BUN 14   CREATININE 1.1     LIVER PROFILE:   Recent Labs     04/13/19  0900   AST 50*   ALT 35   BILITOT 0.8   ALKPHOS 74     UA:  Recent Labs     04/13/19  0900   PHUR 6.0      U/A:    Lab Results   Component Value Date    NITRITE negative 11/15/2018    COLORU yellow 11/15/2018    COLORU YELLOW 11/15/2018    WBCUA 0 11/15/2018    RBCUA 1 11/15/2018    CLARITYU clear 11/15/2018    CLARITYU Clear 11/15/2018    SPECGRAV 1.010 11/15/2018    SPECGRAV 1.008 11/15/2018    LEUKOCYTESUR negative 11/15/2018    LEUKOCYTESUR Negative 11/15/2018    BLOODU moderate 11/15/2018    BLOODU MODERATE 11/15/2018    GLUCOSEU negative 11/15/2018    GLUCOSEU Negative 11/15/2018     RADIOLOGY    XR ANKLE LEFT (MIN 3 VIEWS) 4/13/2019   Final Result   No acute osseous abnormality. ASSESSMENT/PLAN:    Mood Disorder  - cont mgmt per [facility]    Left Ankle Pain  - XR showed no acute fx  - PRN Ibuprofen  - resolved    Childhood Asthma  - no recent AE  - stable    Pt has no medical complaints at this time. Pt was informed that they may have [facility] contact us should any medical concerns arise during this admission.     Marcell Carey PA-C 9:28 AM 4/14/2019

## 2019-04-14 NOTE — GROUP NOTE
Group Therapy Note    Date: April 14    Group Start Time: 8623  Group End Time: [address]  Group Topic: Psychoeducation    [facility]        Group Therapy Note    Attendees: 13    Patient's Goal: to participate in group discussion identifying positive/negative coping skills and participate in group activity by practicing identifying coping skills utilized within songs. Notes:  Nancy Gustafson actively participated in group discussion and activity. Pt identified multiple positive and negative coping skills. Nancy Gustafson was observed supporting multiple peers during group. Pt provided with a list of positive coping skills. Status After Intervention:  Improved    Participation Level:  Active Listener and Interactive    Participation Quality: Appropriate, Attentive, Sharing and Supportive      Speech:  normal      Thought Process/Content: Logical  Linear      Affective Functioning: Congruent      Mood: euthymic      Level of consciousness:  Alert, Oriented x4 and Attentive      Response to Learning: Able to verbalize current knowledge/experience, Able to verbalize/acknowledge new learning, Able to retain information and Progressing to goal      Endings: None Reported    Modes of Intervention: Education, Support, Socialization, Exploration, Clarifying, Problem-solving, Activity and Media      Discipline Responsible: Psychoeducational Specialist      Signature:  RENETTA Lemon

## 2019-04-14 NOTE — H&P
Initial Psychiatric Diagnostic Evaluation  History and Physical    Jami Carter  [id number]      Reason for Admission:  Context: New Onset  Location: Mood  Duration: 7 days. Severity on Admission: severe  Associated Symptoms on Admission: increased activity, decreased sleep, increased energy, started running up to 6 miles - not a runner, increased social interactions, pressured speech, euphoric mood, suicidal ideation with plan to sit in running car (1 month ago, this was reason he started Lexapro); wife reported very uncharacteristic behaviors -started going to the bar, drinking, staying out all night, flirting with other women    Modifying Factors: started Lexapro    Initial Presentation:  Taken from [facility] Note  Patient presents to [facility] voluntarily with his wife. Patient was clinically sober at the time of the evaluation. Patient was evaluated and offered supportive counseling. Pt brought himself in with his wife for an evaluation. Pt reported that a month and half ago he was placed on lexapro for depression and now he thought he was feeling better, but has been told by his wife and parents that he does not seem to be acting like himself. Pt is now confused because he thought he was now acting happy. Pt reported that now he is doing everything excessively. He reported that he is only sleeping 3-4 hours a night for past 2 and half weeks. He reported that he had not run in 2 years and he just ran 6 miles the other day without training. Pt reported that he is only eating 1 meal a day and he is going out more with friends now and staying out later. He reported that he has lost 10 lbs. Pt reported that he has racing thoughts and that he feels hyper manic, but presents as depressed. Pt is not presenting hypermanic symptoms and is currently sleeping in bed. Pt reported chronic fleeting suicidal thoughts since age 13. Pt reported thoughts of sitting in car with garage down, but denies any intent to act on thoughts. Pt denies any previous suicidal attempts. Pt reported that he is currently not happy in his marriage and is only staying because of their child and wife is pregnant. 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 Pt is willing to come into hospital or go to PHP/IOP program depending on what is recommended. INITIAL PSYCHIATRIC ASSESSMENT:  Sidney Magana is a 29 y.o. male who was admitted from [facility] for new onset of manic symptoms. Reports hx of depression. First depressive episode age 13, lasted 2 years, stopped medication and managed his depression until 1.5 years ago and had been experiencing hypersomnia, anhedonia, low energy, low motivation and suicidal ideation with a plan to sit in his running car. He decided to restart medication 4-6 weeks ago and was placed on Lexapro by his PCP. In the last 2 weeks, increased energy, minimal sleep and does not feel tired when he doesn't sleep, increased goal driven activity, started running - up to 6 miles at a time, he does not drink and has started going out to bars and drinking and flirting with other women which wife reported is very uncharacteristic of him. He presents today with euphoric mood, excessive speech, quite social with peers, busying himself with activities on the unit. He reports he feels \"the best I have ever felt, I think maybe I was depressed for years and this is what happiness must feel like. \"  He is able to agree that his mood may be too elevated and he has been behaving in a way that was out of character for him and may have consequences of disrupted relationships.            Past Psychiatric History:  Past Diagnosis: MDD  Past Suicide Attempts: none  Past NSSIB: none  Past Violence: none  Previous Admits: none  Outpatient Services: PCP Rx Lexapro  Past Medication Trials: Lexapro  Family Hx Psychiatric: depression both sides    Substance Abuse History:  ETOH: Socially drinking - this is new in the past few weeks  Illicit: denies  Prescription: denies    Psychosocial/Family History:   Relationship Status:    Children: 3 yo daughter, wife due with a son in 4 weeks  Housing: with wife and child  Income: manager at [employer]    History:  Social History     Socioeconomic History    Marital status:      Spouse name: Not on file    Number of children: Not on file    Years of education: Not on file    Highest education level: Not on file   Occupational History    Not on file   Social Needs    Financial resource strain: Not on file    Food insecurity:     Worry: Not on file     Inability: Not on file    Transportation needs:     Medical: Not on file     Non-medical: Not on file   Tobacco Use    Smoking status: Never Smoker    Smokeless tobacco: Never Used   Substance and Sexual Activity    Alcohol use: Yes     Comment: 1-2 drinks occasionally    Drug use: No    Sexual activity: Yes     Partners: Female   Lifestyle    Physical activity:     Days per week: Not on file     Minutes per session: Not on file    Stress: Not on file   Relationships    Social connections:     Talks on phone: Not on file     Gets together: Not on file     Attends Tenriism service: Not on file     Active member of club or organization: Not on file     Attends meetings of clubs or organizations: Not on file     Relationship status: Not on file    Intimate partner violence:     Fear of current or ex partner: Not on file     Emotionally abused: Not on file     Physically abused: Not on file     Forced sexual activity: Not on file   Other Topics Concern    Not on file   Social History Narrative    Not on file     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    Panic attack       History reviewed. No pertinent surgical history.    Family History   Problem Relation Age of Onset    High Blood Pressure Father     Depression Father     Diabetes Maternal Grandfather     Stroke Maternal Grandfather     High mmol/L    Potassium 3.8 3.5 - 5.1 mmol/L    Chloride 100 99 - 110 mmol/L    CO2 26 21 - 32 mmol/L    Anion Gap 12 3 - 16    Glucose 112 (H) 70 - 99 mg/dL    BUN 14 7 - 20 mg/dL    CREATININE 1.1 0.9 - 1.3 mg/dL    GFR Non-African American >60 >60    GFR African American >60 >60    Calcium 9.2 8.3 - 10.6 mg/dL    Total Protein 7.1 6.4 - 8.2 g/dL    Alb 4.7 3.4 - 5.0 g/dL    Albumin/Globulin Ratio 2.0 1.1 - 2.2    Total Bilirubin 0.8 0.0 - 1.0 mg/dL    Alkaline Phosphatase 74 40 - 129 U/L    ALT 35 10 - 40 U/L    AST 50 (H) 15 - 37 U/L    Globulin 2.4 g/dL   Drug screen multi urine    Collection Time: 04/13/19  9:00 AM   Result Value Ref Range    Amphetamine Screen, Urine Neg Negative <1000ng/mL    Barbiturate Screen, Ur Neg Negative <200 ng/mL    Benzodiazepine Screen, Urine Neg Negative <200 ng/mL    Cannabinoid Scrn, Ur Neg Negative <50 ng/mL    Cocaine Metabolite Screen, Urine Neg Negative <300 ng/mL    Opiate Scrn, Ur Neg Negative <300 ng/mL    PCP Screen, Urine Neg Negative <25 ng/mL    Methadone Screen, Urine Neg Negative <300 ng/mL    Propoxyphene Scrn, Ur Neg Negative <300 ng/mL    pH, UA 6.0     Drug Screen Comment: see below     Oxycodone Urine Neg Negative <100 ng/ml   Ethanol    Collection Time: 04/13/19  9:00 AM   Result Value Ref Range    Ethanol Lvl None Detected mg/dL       Physical Assessment: Per Internal Medicine     PSYCHIATRIC ASSESSMENT   MENTAL STATUS EXAM  General appearance: well groomed   Activity: increased  Relatedness: cooperative  Speech: mildly pressured  Mood: euphoric  Affect: bright  Thought process: linear   Thought content: future oriented  Delusions: none  Hallucination reported: none  Observed RTIS: none     Attention and concentration: fair  Orientation: x4   Memory: Remote intact Recent  intact    SAFETY ASSESSMENT  Suicidal ideation: denies  Homicidal ideation: denies  Non-suicidal self-injurious behaviors: none  Ability to care for self: yes  Insight: limited  Judgment: poor, impulsive    Diagnoses  1. Bipolar, first episode neha, moderate   2.    3.    4.     5.         Plan   Reviewed nursing and ancillary staff notes from last 24 hours. Evaluated medications and assessed for side effects and effectiveness. Assessed patient's educational needs including reviewing Plan of Care, medications and diagnosis. Requires Inpatient Hospitalization as Least Restrictive Environment: YES    1. Bipolar:  · Day 1: D/C Lexapro, start Lamictal 50 mg HS q14 days then increase to 100 mg.     · Multidisciplinary evaluation    · Encourage participation in therapeutic programming and milieu activities    · Monitor for safety    · OT Evaluation/Treat  2. Legal: voluntary   3.  Discharge:   · Collaborate with  to gather collateral and determine appropriate community support and disposition     · Follow up/Disposition: Has an appointment Thursday with a psychiatrist at [facility]    Dr. Juliano Lr, DNP, APRN, [facility] CNP  04/14/19      Total time: 75 minutes spent with patient completing evaluation process and more than 50% of the time was spent completing evaluation and planning treatment with the patient

## 2019-04-14 NOTE — GROUP NOTE
Group Therapy Note    Date: April 14    Group Start Time: 1400  Group End Time: [address]: Psychoeducation    [facility]        Group Therapy Note    Attendees: 10    Patient's Goal: to participate in group discussion about \"It's Time\" by [employer] and participate in group activity, identifying current descriptors of self and goals for the future. Notes:  Autumn Jones actively participated in group discussion and activity. Pt shared current descriptors and goals for the future, including \"recognizing how I feel and not putting on my 'resting smile face' to get through everything. \"     Status After Intervention:  Improved    Participation Level:  Active Listener and Interactive    Participation Quality: Appropriate, Attentive and Sharing      Speech:  normal      Thought Process/Content: Logical  Linear      Affective Functioning: Congruent      Mood: euthymic      Level of consciousness:  Alert, Oriented x4 and Attentive      Response to Learning: Able to verbalize current knowledge/experience, Capable of insight and Progressing to goal      Endings: None Reported    Modes of Intervention: Education, Support, Socialization, Exploration, Clarifying, Activity and Media      Discipline Responsible: Psychoeducational Specialist      Signature:  RENETTA Bell

## 2019-04-14 NOTE — GROUP NOTE
Group Therapy Note    Date: April 13    Group Start Time: 2010  Group End Time: 2030  Group Topic: Wrap-Up    [facility]        Group Therapy Note    Goals and importance of goal setting discussed.   Night time milieu activities discussed         Patient's Goal:  Get acclimated to unit    Notes:  successful    Status After Intervention:  Improved    Participation Level: Interactive    Participation Quality: Appropriate      Speech:  normal      Thought Process/Content: Logical      Affective Functioning: Exaggerated      Mood: euphoric      Level of consciousness:  Alert and Oriented x4      Response to Learning: Progressing to goal      Endings: None Reported    Modes of Intervention: Support      Discipline Responsible: [place]      Signature:  Daryl Hudson

## 2019-04-14 NOTE — BH NOTE
MT-BC met with pt to complete AT/OT Leisure Assessment. Pt states his favorite leisure skills are \"exercising, listening to music, and hanging out with my daughter. \" Pt's support system consists of his wife, dad, mom, mother-in-law, friends, and coworkers. Pt states \"I was depressed for a little bit and was put on Lexapro. I've been on Lexapro and I thought I felt great. But everyone around me thought I was acting strange, like I went from running one mile to six miles, excess behaviors like that. \" Pt states he would like to work on  \"figuring out what my path forward looks like from here and adjusting medication. \"    Mirlande Escobar, MT-BC

## 2019-04-15 VITALS
RESPIRATION RATE: 18 BRPM | BODY MASS INDEX: 24.52 KG/M2 | TEMPERATURE: 98.5 F | HEIGHT: 73 IN | HEART RATE: 69 BPM | OXYGEN SATURATION: 97 % | SYSTOLIC BLOOD PRESSURE: 113 MMHG | DIASTOLIC BLOOD PRESSURE: 63 MMHG | WEIGHT: 185 LBS

## 2019-04-15 PROBLEM — F31.12 BIPOLAR I DISORDER, MOST RECENT EPISODE (OR CURRENT) MANIC, MODERATE (HCC): Status: ACTIVE | Noted: 2019-04-13

## 2019-04-15 PROBLEM — F31.0 BIPOLAR I DISORDER, CURRENT OR MOST RECENT EPISODE HYPOMANIC (HCC): Status: ACTIVE | Noted: 2019-04-13

## 2019-04-15 PROCEDURE — 99239 HOSP IP/OBS DSCHRG MGMT >30: CPT | Performed by: NURSE PRACTITIONER

## 2019-04-15 RX ORDER — LAMOTRIGINE 100 MG/1
100 TABLET ORAL NIGHTLY
Qty: 30 TABLET | Refills: 0 | Status: SHIPPED | OUTPATIENT
Start: 2019-04-15

## 2019-04-15 RX ORDER — LAMOTRIGINE 25 MG/1
50 TABLET ORAL NIGHTLY
Qty: 28 TABLET | Refills: 0 | Status: SHIPPED | OUTPATIENT
Start: 2019-04-15 | End: 2019-07-24 | Stop reason: SDUPTHER

## 2019-04-15 NOTE — DISCHARGE SUMMARY
Department of Psychiatry    Discharge Summary      Jett Mckinnon  [id number]    Admission date:   4/13/2019    Discharge:   Date: 04/15/19  Location: Home    Inpatient Provider: Dr. Joselito Roa, DNP, APRN, PMHNP  Unit: [facility]    Diagnosis on Discharge: Active Hospital Problems    Diagnosis Date Noted    Acute left ankle pain [M25.572]     Bipolar I disorder, most recent episode (or current) manic, moderate ([address] [F31.12] 04/13/2019       Reason for Admission:  Context: New Onset  Location: Mood  Duration: 7 days. Severity on Admission: severe  Associated Symptoms on Admission: increased activity, decreased sleep, increased energy, started running up to 6 miles - not a runner, increased social interactions, pressured speech, euphoric mood, suicidal ideation with plan to sit in running car (1 month ago, this was reason he started Lexapro); wife reported very uncharacteristic behaviors -started going to the bar, drinking, staying out all night, flirting with other women    Modifying Factors: started Lexapro     Initial Presentation:  Taken from [facility]  Patient presents to [facility] voluntarily with his wife. Destin Wing was clinically sober at the time of the evaluation.  Patient was evaluated and offered supportive counseling.     Pt brought himself in with his wife for an evaluation. Pt reported that a month and half ago he was placed on lexapro for depression and now he thought he was feeling better, but has been told by his wife and parents that he does not seem to be acting like himself. Pt is now confused because he thought he was now acting happy. Pt reported that now he is doing everything excessively. He reported that he is only sleeping 3-4 hours a night for past 2 and half weeks. He reported that he had not run in 2 years and he just ran 6 miles the other day without training. Pt reported that he is only eating 1 meal a day and he is going out more with friends now and staying out later.  He reported that he has lost 10 lbs. Pt reported that he has racing thoughts and that he feels hyper manic, but presents as depressed. Pt is not presenting hypermanic symptoms and is currently sleeping in bed. Pt reported chronic fleeting suicidal thoughts since age 13. Pt reported thoughts of sitting in car with garage down, but denies any intent to act on thoughts. Pt denies any previous suicidal attempts. Pt reported that he is currently not happy in his marriage and is only staying because of their child and wife is pregnant. 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 Pt is willing to come into hospital or go to PHP/IOP program depending on what is recommended.      INITIAL PSYCHIATRIC ASSESSMENT:  Jett Mckinnon is a 29 y.o. male who was admitted from [facility] for new onset of manic symptoms. Reports hx of depression. First depressive episode age 13, lasted 2 years, stopped medication and managed his depression until 1.5 years ago and had been experiencing hypersomnia, anhedonia, low energy, low motivation and suicidal ideation with a plan to sit in his running car. He decided to restart medication 4-6 weeks ago and was placed on Lexapro by his PCP. In the last 2 weeks, increased energy, minimal sleep and does not feel tired when he doesn't sleep, increased goal driven activity, started running - up to 6 miles at a time, he does not drink and has started going out to bars and drinking and flirting with other women which wife reported is very uncharacteristic of him. He presents today with euphoric mood, excessive speech, quite social with peers, busying himself with activities on the unit. He reports he feels \"the best I have ever felt, I think maybe I was depressed for years and this is what happiness must feel like. \"  He is able to agree that his mood may be too elevated and he has been behaving in a way that was out of character for him and may have consequences of disrupted relationships. History:   Diagnosis Date    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    Panic attack       History reviewed. No pertinent surgical history.    Social History     Tobacco Use    Smoking status: Never Smoker    Smokeless tobacco: Never Used   Substance Use Topics    Alcohol use: Yes     Comment: 1-2 drinks occasionally      Family History   Problem Relation Age of Onset    High Blood Pressure Father     Depression Father     Diabetes Maternal Grandfather     Stroke Maternal Grandfather     High Cholesterol Maternal Grandfather     Heart Disease Paternal Grandfather     Thyroid Disease Paternal Grandfather     Depression Paternal Grandfather     Thyroid Disease Maternal Aunt     Depression Paternal Aunt     Allergies Paternal Grandmother     Cancer Paternal Grandmother         Ovarian        Medications Prior to Admission: [DISCONTINUED] escitalopram (LEXAPRO) 10 MG tablet, Take 1 tablet by mouth daily  No Known Allergies     Objective:  Vital signs in last 24 hours:  Vitals:    04/15/19 0845   BP: 113/63   Pulse: 69   Resp:    Temp: 98.5 °F (36.9 °C)   SpO2:        Recent Results (from the past 504 hour(s))   CBC auto differential    Collection Time: 04/13/19  9:00 AM   Result Value Ref Range    WBC 8.5 4.0 - 11.0 K/uL    RBC 5.02 4.20 - 5.90 M/uL    Hemoglobin 14.9 13.5 - 17.5 g/dL    Hematocrit 44.3 40.5 - 52.5 %    MCV 88.2 80.0 - 100.0 fL    MCH 29.7 26.0 - 34.0 pg    MCHC 33.6 31.0 - 36.0 g/dL    RDW 14.1 12.4 - 15.4 %    Platelets 642 525 - 236 K/uL    MPV 7.3 5.0 - 10.5 fL    Neutrophils % 75.7 %    Lymphocytes % 16.2 %    Monocytes % 7.2 %    Eosinophils % 0.3 %    Basophils % 0.6 %    Neutrophils # 6.4 1.7 - 7.7 K/uL    Lymphocytes # 1.4 1.0 - 5.1 K/uL    Monocytes # 0.6 0.0 - 1.3 K/uL    Eosinophils # 0.0 0.0 - 0.6 K/uL    Basophils # 0.1 0.0 - 0.2 K/uL   Comprehensive metabolic panel    Collection Time: 04/13/19  9:00 AM   Result Value Ref Range    Sodium 138 136 - 145 mmol/L    Potassium 3.8 3.5 - 5.1 mmol/L    Chloride 100 99 - 110 mmol/L    CO2 26 21 - 32 mmol/L    Anion Gap 12 3 - 16    Glucose 112 (H) 70 - 99 mg/dL    BUN 14 7 - 20 mg/dL    CREATININE 1.1 0.9 - 1.3 mg/dL    GFR Non-African American >60 >60    GFR African American >60 >60    Calcium 9.2 8.3 - 10.6 mg/dL    Total Protein 7.1 6.4 - 8.2 g/dL    Alb 4.7 3.4 - 5.0 g/dL    Albumin/Globulin Ratio 2.0 1.1 - 2.2    Total Bilirubin 0.8 0.0 - 1.0 mg/dL    Alkaline Phosphatase 74 40 - 129 U/L    ALT 35 10 - 40 U/L    AST 50 (H) 15 - 37 U/L    Globulin 2.4 g/dL   Drug screen multi urine    Collection Time: 04/13/19  9:00 AM   Result Value Ref Range    Amphetamine Screen, Urine Neg Negative <1000ng/mL    Barbiturate Screen, Ur Neg Negative <200 ng/mL    Benzodiazepine Screen, Urine Neg Negative <200 ng/mL    Cannabinoid Scrn, Ur Neg Negative <50 ng/mL    Cocaine Metabolite Screen, Urine Neg Negative <300 ng/mL    Opiate Scrn, Ur Neg Negative <300 ng/mL    PCP Screen, Urine Neg Negative <25 ng/mL    Methadone Screen, Urine Neg Negative <300 ng/mL    Propoxyphene Scrn, Ur Neg Negative <300 ng/mL    pH, UA 6.0     Drug Screen Comment: see below     Oxycodone Urine Neg Negative <100 ng/ml   Ethanol    Collection Time: 04/13/19  9:00 AM   Result Value Ref Range    Ethanol Lvl None Detected mg/dL         40 Minutes

## 2019-04-15 NOTE — BH NOTE
Group Therapy Note    Date: 4/14/2019  Start Time: 2030  End Time:  2100  Number of Participants: 9    Type of Group: Wrap-Up      Patient's Goal: Yoga     Notes:  Patient participated in group as evidence by verbal feedback. Patient shared \"I did Yoga\". Status After Intervention:  Improved    Participation Level:  Active Listener and Interactive    Participation Quality: Appropriate, Attentive, Sharing and Supportive      Speech:  pressured      Thought Process/Content: Logical  Linear      Affective Functioning: Exaggerated      Mood: elevated      Level of consciousness:  Oriented x4      Response to Learning: Capable of insight, Able to change behavior and Progressing to goal      Endings: None Reported    Modes of Intervention: Support and Socialization      Discipline Responsible: Behavorial Health Tech      Signature:  [address]

## 2019-04-15 NOTE — PLAN OF CARE
Problem: Altered Mood, Depressive Behavior:  Goal: Ability to disclose and discuss suicidal ideas will improve  Description  Ability to disclose and discuss suicidal ideas will improve  4/15/2019 0935 by Milly Burton LPN  Outcome: Met This Shift  Note:   Denies having thoughts of self harm and contracts for safety. 4/15/2019 0135 by Charlette Loja RN  Outcome: Ongoing     Problem: Altered Mood, Depressive Behavior:  Goal: Able to verbalize and/or display a decrease in depressive symptoms  Description  Able to verbalize and/or display a decrease in depressive symptoms  4/15/2019 0935 by Milly Burton LPN  Outcome: Met This Shift  Note:   Social in milieu. Bright affect. Pleasant.  Attending groups  4/15/2019 0135 by Charlette Loja RN  Outcome: Ongoing

## 2019-04-15 NOTE — PLAN OF CARE
Problem: Altered Mood, Depressive Behavior:  Goal: Able to verbalize and/or display a decrease in depressive symptoms  Description  Able to verbalize and/or display a decrease in depressive symptoms  4/15/2019 0135 by Tavo Quinones RN  Outcome: Ongoing  4/14/2019 1910 by Autumn Echevarria LPN  Outcome: Met This Shift     Problem: Altered Mood, Depressive Behavior:  Goal: Ability to disclose and discuss suicidal ideas will improve  Description  Ability to disclose and discuss suicidal ideas will improve  Outcome: Ongoing   Pt has been out on the unit all evening and appears brightened and cheerful. Pt is alert. When talking about his marriage, he plans to stay for sure until his wife has the baby and they've had time together. He's not sure that they can make the marriage work. He feels they have little in common and look at things very differently. Pt seems to be coping well at this time. Denies any SI and contracts for safety. Pt took first dose of Lamictal this evening.

## 2019-04-15 NOTE — GROUP NOTE
Group Therapy Note    Date: April 15    Group Start Time: 1100  Group End Time: 1145  Group Topic: Psychotherapy    [facility]    Mary Fernández [facility]        Group Therapy Note    Attendees: 11         Patient's Goal:  Pt will improve interpersonal interactions through group processing and agenda setting.      Notes:  Pt participated in group by listening.      Status After Intervention:  Unchanged    Participation Level: Minimal    Participation Quality: Appropriate and Attentive      Speech:  normal      Thought Process/Content: Logical  Linear      Affective Functioning: Congruent      Mood: euthymic      Level of consciousness:  Alert, Oriented x4 and Attentive      Response to Learning: Able to verbalize current knowledge/experience      Endings: None Reported    Modes of Intervention: Education, Support, Socialization, Exploration, Clarifying, Problem-solving, Activity and Movement      Discipline Responsible: /Counselor      Signature:  Mary Fernández [facility]

## 2019-04-15 NOTE — GROUP NOTE
Group Therapy Note    Date: April 15    Group Start Time: 1000  Group End Time: 1050  Group Topic: Psychoeducation    [facility] [place], [place]        Group Therapy Note    Attendees: 10    Patient's Goal:  To achieve a relaxed state, while engaging in aroma therapy and discussing healthy sleeping habits. Notes: Pt engaged in group activity. Pt was able to achieve a relaxed state. Pt was given resources on materials used in group session. Status After Intervention:  Improved    Participation Level:  Active Listener and Interactive    Participation Quality: Attentive, Sharing and Supportive      Speech:  normal      Thought Process/Content: Logical      Affective Functioning: Congruent      Mood: depressed      Level of consciousness:  Alert and Attentive      Response to Learning: Able to retain information and Capable of insight      Endings: None Reported    Modes of Intervention: Education, Support, Socialization and Activity      Discipline Responsible: Psychoeducational Specialist      Signature:  Becky Cotton MA, CTRS

## 2019-04-15 NOTE — BH NOTE
[facility]  Discharge Note    Pt discharged with followings belongings:   Dentures: None  Vision - Corrective Lenses: None  Hearing Aid: None  Jewelry: None  Body Piercings Removed: N/A  Clothing: Footwear, Jacket / coat  Were All Patient Medications Collected?: Not Applicable  Other Valuables: Luster Eisenmenger, Cell phone(multiple debit cards in wallet, belt )   Valuables sent home with patient. Patient left department with Departure Mode: With spouse via Mobility at Departure: Ambulatory, discharged to Discharged to: Private Residence. Patient education on aftercare instructions: follow up and medications. Patient verbalize understanding of AVS:  yes. Status EXAM upon discharge:  Status and Exam  Normal: Yes  Facial Expression: Brightened  Affect: Appropriate  Level of Consciousness: Alert  Mood:Normal: Yes  Mood: (eyuthmyic)  Motor Activity:Normal: Yes  Interview Behavior: Cooperative  Preception: [place] to Person, Jodell Punter to Time, [place] to Place, [place] to Situation  Attention:Normal: Yes  Thought Processes: Circumstantial(improved)  Thought Content:Normal: Yes  Thought Content: (N/A)  Hallucinations: None  Delusions: No  Memory:Normal: Yes  Insight and Judgment: No  Insight and Judgment: Poor Insight(improved)  Present Suicidal Ideation: No  Present Homicidal Ideation: No       Bridge Appointment completed: Reviewed Discharge Instructions with patient. Patient verbalizes understanding and agreement with the discharge plan using the teachback method.      Referral for Outpatient Tobacco Cessation Counseling, upon discharge (trish X if applicable and completed):    ( )  Hospital staff assisted patient to call Quit Line or faxed referral                                   during hospitalization                  ( )  Recognizing danger situations (included triggers and roadblocks), if not completed on admission                    ( )  Coping skills (new ways to manage stress, exercise, relaxation techniques, changing routine, distraction), if not completed on admission                                                           ( )  Basic information about quitting (benefits of quitting, techniques in how to quit, available resources, if not completed on admission  ( ) Referral for counseling faxed to [facility]   ( x) Patient refused referral  ( ) Patient refused counseling  ( ) Patient refused smoking cessation medication upon discharge    Vaccinations (trish X if applicable and completed):   (x ) Patient states already received influenza vaccine elsewhere  ( ) Patient received influenza vaccine during this hospitalization  ( ) Patient refused influenza vaccine at this time    Yovani Albright RN

## 2019-07-24 ENCOUNTER — OFFICE VISIT (OUTPATIENT)
Dept: FAMILY MEDICINE CLINIC | Age: 29
End: 2019-07-24
Payer: COMMERCIAL

## 2019-07-24 ENCOUNTER — OFFICE VISIT (OUTPATIENT)
Dept: ORTHOPEDIC SURGERY | Age: 29
End: 2019-07-24
Payer: COMMERCIAL

## 2019-07-24 VITALS
SYSTOLIC BLOOD PRESSURE: 110 MMHG | BODY MASS INDEX: 25.2 KG/M2 | HEART RATE: 77 BPM | TEMPERATURE: 98.2 F | OXYGEN SATURATION: 97 % | WEIGHT: 191 LBS | DIASTOLIC BLOOD PRESSURE: 62 MMHG

## 2019-07-24 VITALS
BODY MASS INDEX: 25.3 KG/M2 | WEIGHT: 190.92 LBS | SYSTOLIC BLOOD PRESSURE: 111 MMHG | HEIGHT: 73 IN | HEART RATE: 68 BPM | DIASTOLIC BLOOD PRESSURE: 68 MMHG

## 2019-07-24 DIAGNOSIS — M25.562 ACUTE PAIN OF LEFT KNEE: Primary | ICD-10-CM

## 2019-07-24 DIAGNOSIS — M25.562 LEFT KNEE PAIN, UNSPECIFIED CHRONICITY: Primary | ICD-10-CM

## 2019-07-24 DIAGNOSIS — M76.52 PATELLAR TENDINITIS OF LEFT KNEE: ICD-10-CM

## 2019-07-24 PROCEDURE — 99213 OFFICE O/P EST LOW 20 MIN: CPT | Performed by: NURSE PRACTITIONER

## 2019-07-24 PROCEDURE — E0114 CRUTCH UNDERARM PAIR NO WOOD: HCPCS | Performed by: PHYSICIAN ASSISTANT

## 2019-07-24 PROCEDURE — 99203 OFFICE O/P NEW LOW 30 MIN: CPT | Performed by: PHYSICIAN ASSISTANT

## 2019-07-24 ASSESSMENT — ENCOUNTER SYMPTOMS
WHEEZING: 0
SHORTNESS OF BREATH: 0

## 2019-07-24 NOTE — PROGRESS NOTES
CHIEF COMPLAINT:    Chief Complaint   Patient presents with    Knee Pain     LEFT KNEE. TRAINING FOR HALF MARATHON - FELT A POP IN HIS KNEE LAST NIGHT. WAS ABLE TO CONTINUE RUNNING LAST NIGHT, BUT WOKE UP WITH KNEE STIFF/SORE LAST NIGHT AND THIS MORNING. PAIN IS MOSTLY ANTERIOR. NO HX OF KNEE PAIN. HISTORY OF PRESENT ILLNESS:                The patient is a 29 y.o. male who presents to clinic for evaluation of left knee pain. He has been training for half marathon and noticed pain in his knee while running last night. He states he felt a pop however, was can able to continue running another several miles. This morning when he woke up, he felt pain and some tightness in the knee. He points directly to the patellar tendon as the location of his pain. Pain is worsened with ambulation and relieved by rest.    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    Panic attack         Work Status: He works in an [employer] department    The pain assessment was noted & is as follows:  Pain Assessment  Location of Pain: Knee  Location Modifiers: Left  Severity of Pain: 4  Quality of Pain: Dull(PULLING)  Duration of Pain: Persistent  Frequency of Pain: Constant  Aggravating Factors: Standing, Walking  Limiting Behavior: Yes  Relieving Factors: Rest, Ice  Result of Injury: Yes  Work-Related Injury: No  Are there other pain locations you wish to document?: No]      Work Status/Functionality:     Past Medical History: Medical history form was reviewed today & can be found in the media tab  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 No pertinent surgical history.   Current Medications:     Current Outpatient Medications:     lamoTRIgine (LAMICTAL) 100 MG tablet, Take 1 tablet by mouth nightly Start in 2 weeks after completion of 50 mg dose, Disp: 30 tablet, Rfl: 0  Allergies:  Patient has no known

## 2019-07-24 NOTE — PROGRESS NOTES
Patient: Joby Parra is a 29 y.o. male who presents today with the following Chief Complaint(s):  Chief Complaint   Patient presents with    Knee Pain     training for a half marathon, left knee         HPI   Edita Gomez is a 30 yo male who is here with c/o left knee pain- Last night was out running- getting ready for a marathon. He states he felt a \"pop\" in his knee. Was able to finish the run and walk home. The more he was sitting the more stiff his knee became. No swelling. He has been using ice. Used a wheelchair to get around at work today. Pain when he tries to straighten knee. Stiff during the night- this morning could not put weight on it. Current Outpatient Medications   Medication Sig Dispense Refill    lamoTRIgine (LAMICTAL) 100 MG tablet Take 1 tablet by mouth nightly Start in 2 weeks after completion of 50 mg dose 30 tablet 0    lamoTRIgine (LAMICTAL) 25 MG tablet Take 2 tablets by mouth nightly for 14 days 28 tablet 0     No current facility-administered medications for this visit. Patient's past medical history, surgical history, family history, medications,  andallergies  were all reviewed and updated as appropriate today. Review of Systems   Constitutional: Negative for fever. Respiratory: Negative for shortness of breath and wheezing. Cardiovascular: Negative for chest pain and palpitations. Musculoskeletal:        Left knee pain- felt \"pop\" when running   Neurological: Negative for headaches. Physical Exam   Constitutional: He is oriented to person, place, and time. Vital signs are normal. He appears well-developed and well-nourished. HENT:   Head: Normocephalic and atraumatic. Mouth/Throat: No posterior oropharyngeal edema or posterior oropharyngeal erythema. Eyes: Conjunctivae are normal.   Neck: Normal range of motion. Cardiovascular: Normal rate, regular rhythm and normal heart sounds. No murmur heard.   Pulmonary/Chest: Effort normal and breath sounds normal. No respiratory distress. He has no wheezes. He has no rales. Musculoskeletal: He exhibits tenderness (tenderness- left lateral knee). Lymphadenopathy:     He has no cervical adenopathy. Neurological: He is alert and oriented to person, place, and time. He has normal reflexes. Skin: Skin is warm and dry. Psychiatric: He has a normal mood and affect. His behavior is normal. Judgment and thought content normal.   Nursing note and vitals reviewed. Vitals:    07/24/19 1623   BP: 110/62   Pulse: 77   Temp: 98.2 °F (36.8 °C)   SpO2: 97%       Assessment:  Encounter Diagnosis   Name Primary?  Acute pain of left knee Yes       Plan:  1. Acute pain of left knee  800mg ibuprofen every 8 hours  Also discussed after hours at [place] if he wishes to go there    - MRI [id number] [place] [address],[address];  Future  - Servando Whiting MD, Orthopedic Surgery, [facility]

## 2019-12-05 ENCOUNTER — OFFICE VISIT (OUTPATIENT)
Dept: FAMILY MEDICINE CLINIC | Age: 29
End: 2019-12-05
Payer: COMMERCIAL

## 2019-12-05 VITALS
BODY MASS INDEX: 26.26 KG/M2 | OXYGEN SATURATION: 99 % | TEMPERATURE: 99 F | WEIGHT: 199 LBS | HEART RATE: 93 BPM | DIASTOLIC BLOOD PRESSURE: 70 MMHG | SYSTOLIC BLOOD PRESSURE: 130 MMHG

## 2019-12-05 DIAGNOSIS — J06.9 VIRAL URI: Primary | ICD-10-CM

## 2019-12-05 PROCEDURE — 99213 OFFICE O/P EST LOW 20 MIN: CPT | Performed by: FAMILY MEDICINE

## 2019-12-05 ASSESSMENT — ENCOUNTER SYMPTOMS
COUGH: 1
RHINORRHEA: 0

## 2019-12-09 ENCOUNTER — TELEPHONE (OUTPATIENT)
Dept: FAMILY MEDICINE CLINIC | Age: 29
End: 2019-12-09

## 2019-12-09 RX ORDER — AMOXICILLIN AND CLAVULANATE POTASSIUM 875; 125 MG/1; MG/1
1 TABLET, FILM COATED ORAL 2 TIMES DAILY
Qty: 20 TABLET | Refills: 0 | Status: SHIPPED | OUTPATIENT
Start: 2019-12-09 | End: 2019-12-19

## 2019-12-11 RX ORDER — AMOXICILLIN AND CLAVULANATE POTASSIUM 875; 125 MG/1; MG/1
1 TABLET, FILM COATED ORAL 2 TIMES DAILY
Qty: 20 TABLET | Refills: 0 | Status: SHIPPED | OUTPATIENT
Start: 2019-12-11 | End: 2019-12-21

## 2019-12-13 ENCOUNTER — TELEPHONE (OUTPATIENT)
Dept: FAMILY MEDICINE CLINIC | Age: 29
End: 2019-12-13

## 2019-12-13 ENCOUNTER — OFFICE VISIT (OUTPATIENT)
Dept: FAMILY MEDICINE CLINIC | Age: 29
End: 2019-12-13
Payer: COMMERCIAL

## 2019-12-13 VITALS
BODY MASS INDEX: 25.73 KG/M2 | HEART RATE: 90 BPM | DIASTOLIC BLOOD PRESSURE: 60 MMHG | TEMPERATURE: 98.6 F | OXYGEN SATURATION: 94 % | SYSTOLIC BLOOD PRESSURE: 90 MMHG | WEIGHT: 195 LBS

## 2019-12-13 DIAGNOSIS — R05.9 COUGH: Primary | ICD-10-CM

## 2019-12-13 DIAGNOSIS — R50.9 FEVER, UNSPECIFIED FEVER CAUSE: ICD-10-CM

## 2019-12-13 LAB
INFLUENZA A ANTIBODY: NORMAL
INFLUENZA B ANTIBODY: NORMAL

## 2019-12-13 PROCEDURE — 99213 OFFICE O/P EST LOW 20 MIN: CPT | Performed by: NURSE PRACTITIONER

## 2019-12-13 PROCEDURE — 87804 INFLUENZA ASSAY W/OPTIC: CPT | Performed by: NURSE PRACTITIONER

## 2019-12-13 RX ORDER — BROMPHENIRAMINE MALEATE, PSEUDOEPHEDRINE HYDROCHLORIDE, AND DEXTROMETHORPHAN HYDROBROMIDE 2; 30; 10 MG/5ML; MG/5ML; MG/5ML
5 SYRUP ORAL 4 TIMES DAILY PRN
Qty: 180 ML | Refills: 0 | Status: SHIPPED | OUTPATIENT
Start: 2019-12-13 | End: 2021-04-22

## 2019-12-13 RX ORDER — DEXTROMETHORPHAN HYDROBROMIDE AND PROMETHAZINE HYDROCHLORIDE 15; 6.25 MG/5ML; MG/5ML
5 SYRUP ORAL 4 TIMES DAILY PRN
Qty: 180 ML | Refills: 0 | Status: SHIPPED | OUTPATIENT
Start: 2019-12-13 | End: 2019-12-13

## 2019-12-13 ASSESSMENT — ENCOUNTER SYMPTOMS
COUGH: 1
SHORTNESS OF BREATH: 0
WHEEZING: 1
DIARRHEA: 1

## 2019-12-13 NOTE — TELEPHONE ENCOUNTER
[facility] is calling because the promethazine is on back order.  Can something else be sent in?

## 2021-03-20 ENCOUNTER — IMMUNIZATION (OUTPATIENT)
Dept: PRIMARY CARE CLINIC | Age: 31
End: 2021-03-20
Payer: COMMERCIAL

## 2021-03-20 PROCEDURE — 0031A PR IMM ADMN SARSCOV2 AD26 5X1010VP/0.5 ML 1 DOSE: CPT | Performed by: FAMILY MEDICINE

## 2021-03-20 PROCEDURE — 91303 COVID-19, J&J VACCINE, PF, 0.5 ML DOSE: CPT | Performed by: FAMILY MEDICINE

## 2021-04-22 ENCOUNTER — OFFICE VISIT (OUTPATIENT)
Dept: FAMILY MEDICINE CLINIC | Age: 31
End: 2021-04-22
Payer: COMMERCIAL

## 2021-04-22 VITALS
OXYGEN SATURATION: 99 % | DIASTOLIC BLOOD PRESSURE: 60 MMHG | HEIGHT: 73 IN | BODY MASS INDEX: 23.99 KG/M2 | HEART RATE: 71 BPM | WEIGHT: 181 LBS | SYSTOLIC BLOOD PRESSURE: 124 MMHG

## 2021-04-22 DIAGNOSIS — Z00.00 WELL ADULT EXAM: Primary | ICD-10-CM

## 2021-04-22 LAB
A/G RATIO: 2.8 (ref 1.1–2.2)
ALBUMIN SERPL-MCNC: 5 G/DL (ref 3.4–5)
ALP BLD-CCNC: 68 U/L (ref 40–129)
ALT SERPL-CCNC: 18 U/L (ref 10–40)
ANION GAP SERPL CALCULATED.3IONS-SCNC: 10 MMOL/L (ref 3–16)
AST SERPL-CCNC: 24 U/L (ref 15–37)
BASOPHILS ABSOLUTE: 0 K/UL (ref 0–0.2)
BASOPHILS RELATIVE PERCENT: 0.8 %
BILIRUB SERPL-MCNC: 0.6 MG/DL (ref 0–1)
BUN BLDV-MCNC: 11 MG/DL (ref 7–20)
CALCIUM SERPL-MCNC: 9.6 MG/DL (ref 8.3–10.6)
CHLORIDE BLD-SCNC: 102 MMOL/L (ref 99–110)
CHOLESTEROL, FASTING: 148 MG/DL (ref 0–199)
CO2: 27 MMOL/L (ref 21–32)
CREAT SERPL-MCNC: 1.2 MG/DL (ref 0.9–1.3)
EOSINOPHILS ABSOLUTE: 0.1 K/UL (ref 0–0.6)
EOSINOPHILS RELATIVE PERCENT: 1.3 %
GFR AFRICAN AMERICAN: >60
GFR NON-AFRICAN AMERICAN: >60
GLOBULIN: 1.8 G/DL
GLUCOSE BLD-MCNC: 101 MG/DL (ref 70–99)
HCT VFR BLD CALC: 44.7 % (ref 40.5–52.5)
HDLC SERPL-MCNC: 45 MG/DL (ref 40–60)
HEMOGLOBIN: 15.2 G/DL (ref 13.5–17.5)
LDL CHOLESTEROL CALCULATED: 88 MG/DL
LYMPHOCYTES ABSOLUTE: 1.7 K/UL (ref 1–5.1)
LYMPHOCYTES RELATIVE PERCENT: 29.9 %
MCH RBC QN AUTO: 29.3 PG (ref 26–34)
MCHC RBC AUTO-ENTMCNC: 34.1 G/DL (ref 31–36)
MCV RBC AUTO: 85.9 FL (ref 80–100)
MONOCYTES ABSOLUTE: 0.4 K/UL (ref 0–1.3)
MONOCYTES RELATIVE PERCENT: 7.7 %
NEUTROPHILS ABSOLUTE: 3.5 K/UL (ref 1.7–7.7)
NEUTROPHILS RELATIVE PERCENT: 60.3 %
PDW BLD-RTO: 13.1 % (ref 12.4–15.4)
PLATELET # BLD: 324 K/UL (ref 135–450)
PMV BLD AUTO: 7.4 FL (ref 5–10.5)
POTASSIUM SERPL-SCNC: 4.4 MMOL/L (ref 3.5–5.1)
RBC # BLD: 5.2 M/UL (ref 4.2–5.9)
SODIUM BLD-SCNC: 139 MMOL/L (ref 136–145)
TOTAL PROTEIN: 6.8 G/DL (ref 6.4–8.2)
TRIGLYCERIDE, FASTING: 73 MG/DL (ref 0–150)
VLDLC SERPL CALC-MCNC: 15 MG/DL
WBC # BLD: 5.8 K/UL (ref 4–11)

## 2021-04-22 PROCEDURE — 99395 PREV VISIT EST AGE 18-39: CPT | Performed by: FAMILY MEDICINE

## 2021-04-22 PROCEDURE — 36415 COLL VENOUS BLD VENIPUNCTURE: CPT | Performed by: FAMILY MEDICINE

## 2021-04-22 ASSESSMENT — PATIENT HEALTH QUESTIONNAIRE - PHQ9
SUM OF ALL RESPONSES TO PHQ QUESTIONS 1-9: 0
SUM OF ALL RESPONSES TO PHQ QUESTIONS 1-9: 0
1. LITTLE INTEREST OR PLEASURE IN DOING THINGS: 0

## 2021-04-22 NOTE — PROGRESS NOTES
Darline Gonsalez (:  1990) is a 27 y.o. male,Established patient, here for evaluation of the following chief complaint(s): Annual Exam      ASSESSMENT/PLAN:  1. Well adult exam  -     Lipid, Fasting  -     COMPREHENSIVE METABOLIC PANEL  -     HEMOGLOBIN A1C  -     CBC WITH AUTO DIFFERENTIAL      No follow-ups on file. SUBJECTIVE/OBJECTIVE:  Darline Gonsalez is a 27 y.o. male. Patient presents with: Annual Exam    Patient presents for annual exam.  Patient notes that he has had no issues. The patients PMH, surgical history, family history, medications, allergies were all reviewed and updated as appropriate today. Review of Systems   Constitutional: Negative for fatigue and unexpected weight change. HENT: Negative for congestion, rhinorrhea and trouble swallowing. Eyes: Negative for pain and visual disturbance. Respiratory: Negative for chest tightness and shortness of breath. Cardiovascular: Negative for chest pain and palpitations. Gastrointestinal: Negative for constipation and diarrhea. Endocrine: Negative for cold intolerance and heat intolerance. Genitourinary: Negative for frequency and urgency. Musculoskeletal: Negative for arthralgias and back pain. Skin: Negative for color change and rash. Allergic/Immunologic: Negative for environmental allergies and food allergies. Neurological: Negative for dizziness and light-headedness. Hematological: Negative for adenopathy. Does not bruise/bleed easily. Psychiatric/Behavioral: Negative for dysphoric mood and sleep disturbance. Physical Exam  Vitals signs and nursing note reviewed. Constitutional:       Appearance: Normal appearance. He is well-developed. HENT:      Head: Normocephalic and atraumatic.       Right Ear: External ear normal.      Left Ear: External ear normal.      Nose: Nose normal.   Eyes:      Conjunctiva/sclera: Conjunctivae normal.      Pupils: Pupils are equal, round, and reactive to light.   Neck:      Musculoskeletal: Normal range of motion and neck supple. Cardiovascular:      Rate and Rhythm: Normal rate and regular rhythm. Heart sounds: Normal heart sounds. No murmur. No friction rub. No gallop. Pulmonary:      Effort: Pulmonary effort is normal. No respiratory distress. Breath sounds: Normal breath sounds. No wheezing. Abdominal:      General: Bowel sounds are normal. There is no distension. Palpations: Abdomen is soft. Tenderness: There is no abdominal tenderness. Musculoskeletal: Normal range of motion. General: No tenderness. Skin:     General: Skin is warm and dry. Neurological:      Mental Status: He is alert and oriented to person, place, and time. Deep Tendon Reflexes: Reflexes are normal and symmetric. Psychiatric:         Behavior: Behavior normal.         Thought Content: Thought content normal.         Judgment: Judgment normal.                 An electronic signature was used to authenticate this note.     --Tristen Mcwilliams MD

## 2021-04-23 LAB
ESTIMATED AVERAGE GLUCOSE: 111.2 MG/DL
HBA1C MFR BLD: 5.5 %

## 2021-05-02 ASSESSMENT — ENCOUNTER SYMPTOMS
TROUBLE SWALLOWING: 0
DIARRHEA: 0
SHORTNESS OF BREATH: 0
RHINORRHEA: 0
EYE PAIN: 0
COLOR CHANGE: 0
CHEST TIGHTNESS: 0
BACK PAIN: 0
CONSTIPATION: 0

## 2021-09-27 ENCOUNTER — OFFICE VISIT (OUTPATIENT)
Dept: FAMILY MEDICINE CLINIC | Age: 31
End: 2021-09-27
Payer: COMMERCIAL

## 2021-09-27 ENCOUNTER — NURSE TRIAGE (OUTPATIENT)
Dept: OTHER | Facility: CLINIC | Age: 31
End: 2021-09-27

## 2021-09-27 VITALS
WEIGHT: 176.4 LBS | HEART RATE: 72 BPM | SYSTOLIC BLOOD PRESSURE: 100 MMHG | OXYGEN SATURATION: 97 % | TEMPERATURE: 96.9 F | BODY MASS INDEX: 23.27 KG/M2 | DIASTOLIC BLOOD PRESSURE: 68 MMHG

## 2021-09-27 DIAGNOSIS — M79.674 PAIN OF TOE OF RIGHT FOOT: Primary | ICD-10-CM

## 2021-09-27 PROCEDURE — 99213 OFFICE O/P EST LOW 20 MIN: CPT | Performed by: NURSE PRACTITIONER

## 2021-09-27 ASSESSMENT — ENCOUNTER SYMPTOMS
RESPIRATORY NEGATIVE: 1
COUGH: 0
SHORTNESS OF BREATH: 0
GASTROINTESTINAL NEGATIVE: 1
WHEEZING: 0

## 2021-09-27 NOTE — TELEPHONE ENCOUNTER
PAIN: \"Is there pain? \" If so, ask: \"How bad is the pain? \"   (e.g., Scale 1-10; or mild, moderate, severe)      3 or 4 out of 10 just irritated     8. TETANUS: For any breaks in the skin, ask: \"When was the last tetanus booster? \"      Na    9. DIABETES: \"Do you have a history of diabetes or poor circulation in the feet? \"      No   10. OTHER SYMPTOMS: \"Do you have any other symptoms? \"         None   11. PREGNANCY: \"Is there any chance you are pregnant? \" \"When was your last menstrual period? \"        Na    Protocols used: TOE INJURY-ADULT-[place]

## 2021-09-27 NOTE — PROGRESS NOTES
Patient: Ana Paula Woods is a 27 y.o. male who presents today with the following Chief Complaint(s):  Chief Complaint   Patient presents with    Toe Pain     right index toe, painful after training for marathon, now feels numb and looks like nail is coming off. Assessment:  Encounter Diagnosis   Name Primary?  Pain of toe of right foot Yes       Plan:  1. Pain of toe of right foot  Patient is likely developing runners toe. Due to him treating he is likely developing a small hematoma up under the nail due to friction with the shoe. Recommended patient try a silicone toe pads wearing extra thick socks to help pad his toe. No signs of infection at this time we will follow-up if worsening or no improvement. HPI   Patient presents today with right foot second toe pain. Patient reports this has been going on for the past couple of days. He has been recently running a lot more in training for the flying pig marathon. He denies any known injury to the foot states it just showed up. He has done random 5K's in the past but never training for a marathon in the past.    Current Outpatient Medications   Medication Sig Dispense Refill    lamoTRIgine (LAMICTAL) 100 MG tablet Take 1 tablet by mouth nightly Start in 2 weeks after completion of 50 mg dose (Patient taking differently: Take 250 mg by mouth nightly ) 30 tablet 0     No current facility-administered medications for this visit. Patient's past medical history, surgical history, family history, medications,and allergies  were all reviewed and updated as appropriate today. Review of Systems   Constitutional: Negative. HENT: Negative. Respiratory: Negative. Negative for cough, shortness of breath and wheezing. Cardiovascular: Negative. Gastrointestinal: Negative. Skin:        Right foot, second toe, nails discoloration, nail is starting to separate from the toe. Neurological: Negative. Psychiatric/Behavioral: Negative. Physical Exam  Vitals reviewed. Feet:      Comments: Right foot second toe, nail discoloration. Redness right around the cuticle of the toenail. No pain to touch. No warmth. Skin:     General: Skin is warm and dry. Neurological:      Mental Status: He is alert. Vitals:    09/27/21 1631   BP: 100/68   Pulse: 72   Temp: 96.9 °F (36.1 °C)   SpO2: 97%       This chart was generated using the Dragon dictation system. I created this record but it may contain dictation errors due to the limitation of the software.

## 2022-03-22 ENCOUNTER — NURSE TRIAGE (OUTPATIENT)
Dept: OTHER | Facility: CLINIC | Age: 32
End: 2022-03-22

## 2022-03-22 NOTE — TELEPHONE ENCOUNTER
Received call from Drea Reed at [facility] with Red Flag Complaint. Subjective: Caller states \"chest pain at night\"     Current Symptoms: chest pain at night 2-3 times per week; when waking has pressure in chest are and shortness of breath that lasts about 30 minutes; Falls asleep frequently during conversations and other activities; Nausea today with chest pressure, not pain; Onset: several months ago; unchanged    Associated Symptoms: increased sleeping    Pain Severity: 2/10; pressure intermittent    Temperature: denies     What has been tried: has a physical scheduled in May    Recommended disposition: See PCP within 3 Days    Care advice provided, patient verbalizes understanding; denies any other questions or concerns; instructed to call back for any new or worsening symptoms. Patient/Caller agrees with recommended disposition; writer provided warm transfer to [facility] at [facility] for appointment scheduling     Attention Provider: Thank you for allowing me to participate in the care of your patient. The patient was connected to triage in response to information provided to the ECC/PSC. Please do not respond through this encounter as the response is not directed to a shared pool.     Reason for Disposition   [1] MILD dizziness (e.g., walking normally) AND [2] has NOT been evaluated by physician for this  (Exception: dizziness caused by heat exposure, sudden standing, or poor fluid intake)    Protocols used: [facility]

## 2022-03-23 ENCOUNTER — OFFICE VISIT (OUTPATIENT)
Dept: FAMILY MEDICINE CLINIC | Age: 32
End: 2022-03-23
Payer: COMMERCIAL

## 2022-03-23 VITALS
HEIGHT: 73 IN | SYSTOLIC BLOOD PRESSURE: 110 MMHG | HEART RATE: 67 BPM | BODY MASS INDEX: 23.72 KG/M2 | OXYGEN SATURATION: 98 % | WEIGHT: 179 LBS | DIASTOLIC BLOOD PRESSURE: 72 MMHG

## 2022-03-23 DIAGNOSIS — R07.9 CHEST PAIN, UNSPECIFIED TYPE: Primary | ICD-10-CM

## 2022-03-23 DIAGNOSIS — G47.9 SLEEP DISORDER: ICD-10-CM

## 2022-03-23 PROCEDURE — 99214 OFFICE O/P EST MOD 30 MIN: CPT | Performed by: FAMILY MEDICINE

## 2022-03-23 PROCEDURE — 93000 ELECTROCARDIOGRAM COMPLETE: CPT | Performed by: FAMILY MEDICINE

## 2022-03-23 NOTE — PROGRESS NOTES
Charlette Woodall (:  1990) is a 32 y.o. male,Established patient, here for evaluation of the following chief complaint(s):  Chest Pain (at night) and Dizziness         ASSESSMENT/PLAN:  1. Chest pain, unspecified type  -     EKG 12 Lead  2. Sleep disorder  -     Evelin Mckeon MD, Sleep Medicine, [facility]  Due to episodes of chest pain EKG was performed today and this was negative. Doubt that he is having any significant cardiac events. At this time he does not like he could have something like a week for this disorder. This may be narcolepsy. No follow-ups on file. Subjective   SUBJECTIVE/OBJECTIVE:  Charlette Woodall is a 32 y.o. male. Patient presents with:  Chest Pain: at night  Dizziness      This is a 43-year-old male who presents for the following issues: Patient has been having sleep disturbance that has been going on since he was a child. He notes that he often falls asleep easily throughout the day. Finds that he wakes up sometimes at night with chest pain and feeling panicky or out of breath. Often has an episode where he can fall asleep very easily and starts to speak gibberish. Patient notes that if he stops doing activity that he will fall asleep very quickly. He notes that he is often very tired. He feels that he does rest at night and does not feel that he snores. He does get up to 8 hours asleep at night. The patients PMH, surgical history, family history, medications, allergies were all reviewed and updated as appropriate today. Review of Systems       Objective   Physical Exam  Vitals and nursing note reviewed. Constitutional:       Appearance: Normal appearance. He is well-developed. HENT:      Head: Normocephalic and atraumatic. Right Ear: External ear normal.      Left Ear: External ear normal.      Nose: Nose normal.   Eyes:      Conjunctiva/sclera: Conjunctivae normal.      Pupils: Pupils are equal, round, and reactive to light. Cardiovascular:      Rate and Rhythm: Normal rate and regular rhythm. Heart sounds: Normal heart sounds. No murmur heard. No friction rub. No gallop. Pulmonary:      Effort: Pulmonary effort is normal. No respiratory distress. Breath sounds: Normal breath sounds. No wheezing. Abdominal:      General: Bowel sounds are normal. There is no distension. Palpations: Abdomen is soft. Tenderness: There is no abdominal tenderness. Musculoskeletal:         General: No tenderness. Normal range of motion. Cervical back: Normal range of motion and neck supple. Skin:     General: Skin is warm and dry. Neurological:      Mental Status: He is alert and oriented to person, place, and time. Deep Tendon Reflexes: Reflexes are normal and symmetric. Psychiatric:         Behavior: Behavior normal.         Thought Content: Thought content normal.         Judgment: Judgment normal.            \    An electronic signature was used to authenticate this note.     --Meredith Kiser MD

## 2022-03-25 ENCOUNTER — TELEPHONE (OUTPATIENT)
Dept: SLEEP MEDICINE | Age: 32
End: 2022-03-25

## 2022-04-19 ENCOUNTER — TELEMEDICINE (OUTPATIENT)
Dept: SLEEP MEDICINE | Age: 32
End: 2022-04-19
Payer: COMMERCIAL

## 2022-04-19 ENCOUNTER — TELEPHONE (OUTPATIENT)
Dept: PULMONOLOGY | Age: 32
End: 2022-04-19

## 2022-04-19 DIAGNOSIS — R06.83 SNORING: Primary | ICD-10-CM

## 2022-04-19 DIAGNOSIS — G47.10 HYPERSOMNIA: ICD-10-CM

## 2022-04-19 DIAGNOSIS — R53.83 OTHER FATIGUE: ICD-10-CM

## 2022-04-19 DIAGNOSIS — G47.30 OBSERVED SLEEP APNEA: ICD-10-CM

## 2022-04-19 PROCEDURE — 99204 OFFICE O/P NEW MOD 45 MIN: CPT | Performed by: NURSE PRACTITIONER

## 2022-04-19 ASSESSMENT — SLEEP AND FATIGUE QUESTIONNAIRES
ESS TOTAL SCORE: 14
HOW LIKELY ARE YOU TO NOD OFF OR FALL ASLEEP WHILE SITTING AND READING: 3
HOW LIKELY ARE YOU TO NOD OFF OR FALL ASLEEP WHILE SITTING INACTIVE IN A PUBLIC PLACE: 1
HOW LIKELY ARE YOU TO NOD OFF OR FALL ASLEEP WHILE SITTING AND TALKING TO SOMEONE: 1
HOW LIKELY ARE YOU TO NOD OFF OR FALL ASLEEP IN A CAR, WHILE STOPPED FOR A FEW MINUTES IN TRAFFIC: 0
HOW LIKELY ARE YOU TO NOD OFF OR FALL ASLEEP WHEN YOU ARE A PASSENGER IN A CAR FOR AN HOUR WITHOUT A BREAK: 2
HOW LIKELY ARE YOU TO NOD OFF OR FALL ASLEEP WHILE LYING DOWN TO REST IN THE AFTERNOON WHEN CIRCUMSTANCES PERMIT: 3
HOW LIKELY ARE YOU TO NOD OFF OR FALL ASLEEP WHILE WATCHING TV: 3
HOW LIKELY ARE YOU TO NOD OFF OR FALL ASLEEP WHILE SITTING QUIETLY AFTER LUNCH WITHOUT ALCOHOL: 1

## 2022-04-19 NOTE — PATIENT INSTRUCTIONS
Sleep Hygiene. .. Tips for better sleep. .. Avoid naps. This will ensure you are sleepy at bedtime. If you have to take a nap, sleep less than 1 hour, before 3 pm.  Sleep only when sleepy; this reduces the time you are awake in bed. Regular exercise is recommended to help you deepen your sleep, but not within 4-6 hours of your bedtime. Timing of exercise is important, aim to exercise early in the morning or early afternoon. A light snack may help you fall asleep. Warm milk and foods high in the amino acid tryptophan, such as bananas, may help you to sleep  Be sure to avoid heavy, spicy or sugary foods 4-6 hours before bedtime and avoid at snack time. Stay away from stimulants such as caffeine and nicotine for at least 4-6 hours before bed. Stimulants can interfere with your ability to fall asleep. Caffeine is found in tea, cola, coffee, cocoa and chocolate and is best avoided at bedtime. Nicotine is found in tobacco products. Avoid alcohol 4-6 hours before bedtime. Alcohol has an immediate sleep-inducing effect, after a few hours when alcohol levels fall there is a stimulant or wake-up effect and will cause fragmented sleep. Sleep rituals are important. Give your body clues it is time to slow down and sleep. Examples include; yoga, deep breathing, listen to relaxing music, a hot bath or a few minutes of reading. Have a fixed bedtime and awakening time, Even on weekends! You will feel better keeping a regular sleep cycle, even if you are retired or not working. Get into your favorite sleep position. If not asleep in 30 minutes, get up and do something boring until you feel sleepy. Remember not to expose yourself to bright lights such as TV, phone or tablet screens. Only use your bed for sleeping. Do not use your bed as an office, workroom or recreation room. Use comfortable bedding. Uncomfortable bedding can prevent good sleep. Ensure your bedroom is quiet and comfortable.  A cooler room along with enough blankets to stay warm is recommended. If your room is too noisy, try a white noise machine. If too bright, try black out shades or an eye mask. Dont take worries to bed. Leave worries about work, school etc. behind you when you go to bed. Some people find it helpful to assign a worry period in the evening or late afternoon to write down your worries and get them out of your system. Address to Sleep Center: [facility] [address] [place] [address], [address] [place] [address]            Phone: [phone] Fax: [phone]    If you should need to cancel or reschedule your appointment, please call the Sleep Center at [phone] as soon as possible. We ask that you please phone the [facility] Patient Pre-Services ([phone]) at least 3-5 days prior to your sleep study to pre-register. Failing to pre-register may ultimately cause your insurance to not pay for this procedure. Please refrain from or reduce the use of caffeine and/or alcohol prior to your sleep study and avoid napping the day of your study as these will affect the accuracy of your test results.

## 2022-04-19 NOTE — TELEPHONE ENCOUNTER
Within this Telehealth Consent, the terms you and yours refer to the person using the Telehealth Service (Service), or in the case of a use of the Service by or on behalf of a minor, you and yours refer to and include (i) the parent or legal guardian who provides consent to the use of the Service by such minor or uses the Service on behalf of such minor, and (ii) the minor for whom consent is being provided or on whose behalf the Service is being utilized. When using Service, you will be consulting with your health care providers via the use of Telehealth.   Telehealth involves the delivery of healthcare services using electronic communications, information technology or other means between a healthcare provider and a patient who are not in the same physical location. Telehealth may be used for diagnosis, treatment, follow-up and/or patient education, and may include, but is not limited to, one or more of the following:    Electronic transmission of medical records, photo images, personal health information or other data between a patient and a healthcare provider    Interactions between a patient and healthcare provider via audio, video and/or data communications    Use of output data from medical devices, sound and video files    Anticipated Benefits   The use of Telehealth by your Provider(s) through the Service may have the following possible benefits:    Making it easier and more efficient for you to access medical care and treatment for the conditions treated by such Provider(s) utilizing the Service    Allowing you to obtain medical care and treatment by Provider(s) at times that are convenient for you    Enabling you to interact with Provider(s) without the necessity of an in-office appointment     Possible Risks   While the use of Telehealth can provide potential benefits for you, there are also potential risks associated with the use of Telehealth.  These risks include, but may not be limited to the following:    Your Provider(s) may not able to provide medical treatment for your particular condition and you may be required to seek alternative healthcare or emergency care services.  The electronic systems or other security protocols or safeguards used in the Service could fail, causing a breach of privacy of your medical or other information.  Given regulatory requirements in certain jurisdictions, your Provider(s) diagnosis and/or treatment options, especially pertaining to certain prescriptions, may be limited. Acceptance   1. You understand that Services will be provided via Telehealth. This process involves the use of HIPAA compliant and secure, real-time audio-visual interfacing with a qualified and appropriately trained provider located at [facility]. 2. You understand that, under no circumstances, will this session be recorded. 3. You understand that the Provider(s) at [facility] and other clinical participants will be party to the information obtained during the Telehealth session in accordance with best medical practices. 4. You understand that the information obtained during the Telehealth session will be used to help determine the most appropriate treatment options. 5. You understand that You have the right to revoke this consent at any point in time. 6. You understand that Telehealth is voluntary, and that continued treatment is not dependent upon consent. 7. You understand that, in the event of non-consent to Telehealth services and/or technical difficulties, you will obtain services as typically provided in the absence of Telehealth technology. 8. You understand that this consent will be kept in Your medical record. 9. No potential benefits from the use of Telehealth or specific results can be guaranteed. Your condition may not be cured or improved and, in some cases, may get worse.    10. There are limitations in the provision of medical care and treatment via Telehealth and the Service and you may not be able to receive diagnosis and/or treatment through the Service for every condition for which you seek diagnosis and/or treatment. 11. There are potential risks to the use of Telehealth, including but not limited to the risks described in this Telehealth Consent. 12. Your Provider(s) have discussed the use of Telehealth and the Service with you, including the benefits and risks of such and you have provided oral consent to your Provider(s) for the use of Telehealth and the Service. 15. You understand that it is your duty to provide your Provider(s) truthful, accurate and complete information, including all relevant information regarding care that you may have received or may be receiving from other healthcare providers outside of the Service. 14. You understand that each of your Provider(s) may determine in his or sole discretion that your condition is not suitable for diagnosis and/or treatment using the Service, and that you may need to seek medical care and treatment a specialist or other healthcare provider, outside of the Service. 15. You understand that you are fully responsible for payment for all services provided by Provider(s) or through use of the Service and that you may not be able to use third-party insurance. 16. You represent that (a) you have read this Telehealth Consent carefully, (b) you understand the risks and benefits of the Service and the use of Telehealth in the medical care and treatment provided to you by Provider(s) using the Service, and (c) you have the legal capacity and authority to provide this consent for yourself and/or the minor for which you are consenting under applicable federal and state laws, including laws relating to the age of [de-identified] and/or parental/guardian consent.    17. You give your informed consent to the use of Telehealth by Provider(s) using the Service under the terms described in the Terms of Service and this Telehealth Consent. The patient was read the following statement and has consented to the visit as of 4/19/22. The patient has been scheduled for their first telehealth visit on 04/19/2022 with Jaden Norwood CNP.

## 2022-04-19 NOTE — LETTER
[facility] [place] [facility]  [address]  [address]  Phone: [phone]  Fax: [phone]           ERNST De Oliveira CNP      April 19, 2022     Patient: Emi Johnson   MR Number: [id number]   YOB: 1990   Date of Visit: 4/19/2022       Dear Dr. Courtney Marin: Thank you for referring Judge Dennison to me for evaluation/treatment. Below are the relevant portions of my assessment and plan of care. Assessment:       · Snoring  · Observed sleep apnea   · Hypersomnia   · Fatigue        Plan:      · HST to evaluate forsleep related breathing disorder. · Treatment options were discussed with patient if HST reveals CRISTIAN, including CPAP/APAP therapy, oral appliances and upper airway surgery. · Trial auto CPAP 6-16 cm H2O if HST is positive for CRISTIAN  · Sleep hygiene  · Avoid sedatives, alcohol and caffeinated drinks at bed time. · No driving motorized vehicles or operating heavy machinery while fatigue, drowsy or sleepy. · Weight loss is also recommended as a long-term intervention. · Complications of CRISTIAN if not treated were discussed with patient patient to include systemic hypertension, pulmonary hypertension, cardiovascular morbidities, car accidents and all cause mortality. Discussed pathophysiology of CRISTIAN with patient today  Patient education handout provided regarding sleep tips      If you have questions, please do not hesitate to call me. I look forward to following Shawna Major along with you.     Sincerely,        ERNST De Oliveira CNP    CC providers:    No Recipients

## 2022-04-19 NOTE — PROGRESS NOTES
Patient ID: Kannan John is a 32 y.o. male who is being seen today for   Chief Complaint   Patient presents with    New Patient     Sleep eval ref by Sheree Luz         HPI:   Kannan John is a 32 y.o. male for televideo appointment via video and audio doxy. me virtual visit for sleep disorder evaluation. Patient reports snoring at night for the past 15-16 years. Worse in supine position. Wakes self snoring. Has witnessed apnea. Wakes up at night choking and gasping for air. No restorative sleep. No dry mouth upon awakening. Fatigue and tiredness during the day. No history of sleep apnea. Bedtime 10-11 pm and rise time is 530 am. It takes few minutes to fall asleep. No nocturia. Wakes up 0-1 times at night. It takes few minutes to fall back a sleep. Takes no nap during the day. No headache in am. No car wrecks or near wrecks because of the sleepiness. No nodding off while driving. No weight gain. . No forgetfulness or decreased concentration. Drinks 2 caffinated beverages per day. No significant alcohol. No restless feelings in legs at night. No teeth grinding. No nightmares. No sleep walking. No night time panic attacks. No narcotics. No drug abuse. +history of depression, +history of anxiety feels well controlled, followed by psychiatry. No history of atrial fibrillation. No history of DM. No history of HTN. No history of ischemic heart disease. No history of stroke. ESS is 14. No smoking. No known FH for CRISTIAN, RLS or narcolepsy.      Sleep Medicine 4/19/2022 11/6/2018   Sitting and reading 3 3   Watching TV 3 3   Sitting, inactive in a public place (e.g. 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 No pertinent surgical history. Allergies:  has No Known Allergies. Social History:    TOBACCO:   reports that he has never smoked. He has never used smokeless tobacco.  ETOH:   reports previous alcohol use. Family History:       Problem Relation Age of Onset    High Blood Pressure Father     Depression Father     Diabetes Maternal Grandfather     Stroke Maternal Grandfather     High Cholesterol Maternal Grandfather     Heart Disease Paternal Grandfather     Thyroid Disease Paternal Grandfather     Depression Paternal Grandfather     Thyroid Disease Maternal Aunt     Depression Paternal Aunt     Allergies Paternal Grandmother     Cancer Paternal Grandmother         Ovarian       Current Medications:    Current Outpatient Medications:     lamoTRIgine (LAMICTAL) 100 MG tablet, Take 1 tablet by mouth nightly Start in 2 weeks after completion of 50 mg dose (Patient taking differently: Take 250 mg by mouth nightly ), Disp: 30 tablet, Rfl: 0        Review of Systems  Constitutional: Negative for fever  HENT: Negative for sore throat  Eyes: Negative for redness   Respiratory: Negative for dyspnea, cough  Cardiovascular: Negative for chest pain  Gastrointestinal: Negative for vomiting, diarrhea   Genitourinary: Negative for hematuria   Musculoskeletal: Negative forarthralgias   Skin: Negative for rash  Neurological: Negative for syncope  Hematological: Negative for adenopathy  Psychiatric/Behavorial: Negative for anxiety      Objective:   PHYSICAL EXAM:  There were no vitals taken for this visit. Physical Exam  Exam:  Gen: No acute distress, does not appear to be in pain. Appears well developed and nourished. HENT: Head is normocephalic and atraumatic. Normal appearing nose. External Ears normal.   Neck: No visualized mass. Trachea is midline   Eyes: EOM intact. No visible discharge. Resp:No visualized signs of difficulty breathing or respiratory distress, speaking in full sentences. Respiratory effort normal.  Neuro: Awake. Alert. Able to follow commands. No facial asymmetry. Skin: No significant lesions or discoloration noted on facial skin    Musculoskeletal: Normal range of motion of the neck. Psych: Oriented x 3. No anxiety. Normal affect. DATA:   2/26/2015 echo EF 60%  9/9/15 TSH 7.4    Assessment:       · Snoring  · Observed sleep apnea   · Hypersomnia   · Fatigue        Plan:      · HST to evaluate forsleep related breathing disorder. · Treatment options were discussed with patient if HST reveals CRISTIAN, including CPAP/APAP therapy, oral appliances and upper airway surgery. · Trial auto CPAP 6-16 cm H2O if HST is positive for CRISTIAN  · Sleep hygiene  · Avoid sedatives, alcohol and caffeinated drinks at bed time. · No driving motorized vehicles or operating heavy machinery while fatigue, drowsy or sleepy. · Weight loss is also recommended as a long-term intervention. · Complications of CRISTIAN if not treated were discussed with patient patient to include systemic hypertension, pulmonary hypertension, cardiovascular morbidities, car accidents and all cause mortality. Discussed pathophysiology of CRISTIAN with patient today  Patient education handout provided regarding sleep tips     Betty Decker, was evaluated through a synchronous (real-time) audio-video encounter. The patient (or guardian if applicable) is aware that this is a billable service, which includes applicable co-pays. This Virtual Visit was conducted with patient's (and/or legal guardian's) consent.  The visit was conducted pursuant to the emergency declaration under the [facility], [address] waiver authority and the [facility] and Response Supplemental Appropriations Act. Patient identification was verified, and a caregiver was present when appropriate. The patient was located at home in a state where the provider was licensed to provide care. Total time spent for this encounter: Not billed by time    --ERNST Uriarte CNP on 4/19/2022 at 2:47 PM    An electronic signature was used to authenticate this note.

## 2022-05-17 ENCOUNTER — HOSPITAL ENCOUNTER (OUTPATIENT)
Dept: SLEEP CENTER | Age: 32
Discharge: HOME | End: 2022-05-19
Payer: COMMERCIAL

## 2022-05-17 ENCOUNTER — OFFICE VISIT (OUTPATIENT)
Dept: FAMILY MEDICINE CLINIC | Age: 32
End: 2022-05-17
Payer: COMMERCIAL

## 2022-05-17 VITALS
OXYGEN SATURATION: 99 % | WEIGHT: 177 LBS | HEIGHT: 73 IN | BODY MASS INDEX: 23.46 KG/M2 | HEART RATE: 69 BPM | DIASTOLIC BLOOD PRESSURE: 78 MMHG | SYSTOLIC BLOOD PRESSURE: 126 MMHG

## 2022-05-17 DIAGNOSIS — G47.10 HYPERSOMNIA: ICD-10-CM

## 2022-05-17 DIAGNOSIS — L70.0 ACNE VULGARIS: ICD-10-CM

## 2022-05-17 DIAGNOSIS — R06.83 SNORING: ICD-10-CM

## 2022-05-17 DIAGNOSIS — R53.83 OTHER FATIGUE: ICD-10-CM

## 2022-05-17 DIAGNOSIS — G47.30 OBSERVED SLEEP APNEA: ICD-10-CM

## 2022-05-17 DIAGNOSIS — Z00.00 ENCOUNTER FOR WELL ADULT EXAM WITHOUT ABNORMAL FINDINGS: Primary | ICD-10-CM

## 2022-05-17 PROCEDURE — 99395 PREV VISIT EST AGE 18-39: CPT | Performed by: FAMILY MEDICINE

## 2022-05-17 PROCEDURE — 95806 SLEEP STUDY UNATT&RESP EFFT: CPT

## 2022-05-17 RX ORDER — ADAPALENE 0.1 G/100G
CREAM TOPICAL
Qty: 45 G | Refills: 0 | Status: SHIPPED | OUTPATIENT
Start: 2022-05-17 | End: 2022-06-16

## 2022-05-17 RX ORDER — DOXYCYCLINE HYCLATE 100 MG
100 TABLET ORAL 2 TIMES DAILY
Qty: 30 TABLET | Refills: 0 | Status: SHIPPED | OUTPATIENT
Start: 2022-05-17 | End: 2022-10-18 | Stop reason: ALTCHOICE

## 2022-05-17 ASSESSMENT — PATIENT HEALTH QUESTIONNAIRE - PHQ9
3. TROUBLE FALLING OR STAYING ASLEEP: 3
10. IF YOU CHECKED OFF ANY PROBLEMS, HOW DIFFICULT HAVE THESE PROBLEMS MADE IT FOR YOU TO DO YOUR WORK, TAKE CARE OF THINGS AT HOME, OR GET ALONG WITH OTHER PEOPLE: 0
SUM OF ALL RESPONSES TO PHQ QUESTIONS 1-9: 19
4. FEELING TIRED OR HAVING LITTLE ENERGY: 3
SUM OF ALL RESPONSES TO PHQ9 QUESTIONS 1 & 2: 5
SUM OF ALL RESPONSES TO PHQ QUESTIONS 1-9: 19
7. TROUBLE CONCENTRATING ON THINGS, SUCH AS READING THE NEWSPAPER OR WATCHING TELEVISION: 3
9. THOUGHTS THAT YOU WOULD BE BETTER OFF DEAD, OR OF HURTING YOURSELF: 0
1. LITTLE INTEREST OR PLEASURE IN DOING THINGS: 2
SUM OF ALL RESPONSES TO PHQ QUESTIONS 1-9: 19
6. FEELING BAD ABOUT YOURSELF - OR THAT YOU ARE A FAILURE OR HAVE LET YOURSELF OR YOUR FAMILY DOWN: 3
8. MOVING OR SPEAKING SO SLOWLY THAT OTHER PEOPLE COULD HAVE NOTICED. OR THE OPPOSITE, BEING SO FIGETY OR RESTLESS THAT YOU HAVE BEEN MOVING AROUND A LOT MORE THAN USUAL: 0
5. POOR APPETITE OR OVEREATING: 2
2. FEELING DOWN, DEPRESSED OR HOPELESS: 3
SUM OF ALL RESPONSES TO PHQ QUESTIONS 1-9: 19

## 2022-05-27 ASSESSMENT — ENCOUNTER SYMPTOMS
RHINORRHEA: 0
CHEST TIGHTNESS: 0
CONSTIPATION: 0
TROUBLE SWALLOWING: 0
COLOR CHANGE: 0
SHORTNESS OF BREATH: 0
DIARRHEA: 0
EYE PAIN: 0
BACK PAIN: 0

## 2022-05-27 NOTE — PROGRESS NOTES
Well Adult Note  Name: Veda Bustos Date: 2022   MRN: [id number] Sex: Male   Age: 32 y.o. Ethnicity: Non- / Non    : 1990 Race: White (non-)      Joel Mendez V is here for well adult exam.  History:  54-year-old male presents for complete physical exam.  He has no Acute complaints today. Review of Systems   Constitutional: Negative for fatigue and unexpected weight change. HENT: Negative for congestion, rhinorrhea and trouble swallowing. Eyes: Negative for pain and visual disturbance. Respiratory: Negative for chest tightness and shortness of breath. Cardiovascular: Negative for chest pain and palpitations. Gastrointestinal: Negative for constipation and diarrhea. Endocrine: Negative for cold intolerance and heat intolerance. Genitourinary: Negative for frequency and urgency. Musculoskeletal: Negative for arthralgias and back pain. Skin: Negative for color change and rash. Allergic/Immunologic: Negative for environmental allergies and food allergies. Neurological: Negative for dizziness and light-headedness. Hematological: Negative for adenopathy. Does not bruise/bleed easily. Psychiatric/Behavioral: Negative for dysphoric mood and sleep disturbance. No Known Allergies    Prior to Visit Medications    Medication Sig Taking? Authorizing Provider   doxycycline hyclate (VIBRA-TABS) 100 MG tablet Take 1 tablet by mouth 2 times daily Yes Radha Brown MD   adapalene (DIFFERIN) 0.1 % cream Apply topically nightly.  Yes Radha Brown MD   lamoTRIgine (LAMICTAL) 100 MG tablet Take 1 tablet by mouth nightly Start in 2 weeks after completion of 50 mg dose  Patient taking differently: Take 250 mg by mouth nightly  Yes ERNST Brown CNP       Past Medical History:   Diagnosis Date    Bipolar I disorder, most recent episode (or current) manic, moderate ([facility] [address]     Carotid body syndrome 2015    Panic attack        History reviewed. No pertinent surgical history. Family History   Problem Relation Age of Onset    High Blood Pressure Father     Depression Father     Diabetes Maternal Grandfather     Stroke Maternal Grandfather     High Cholesterol Maternal Grandfather     Heart Disease Paternal Grandfather     Thyroid Disease Paternal Grandfather     Depression Paternal Grandfather     Thyroid Disease Maternal Aunt     Depression Paternal Aunt     Allergies Paternal Grandmother     Cancer Paternal Grandmother         Ovarian       Social History     Tobacco Use    Smoking status: Never Smoker    Smokeless tobacco: Never Used   Vaping Use    Vaping Use: Never used   Substance Use Topics    Alcohol use: Not Currently     Comment: 1-2 drinks occasionally    Drug use: No       Objective   /78   Pulse 69   Ht 6' 1\" (1.854 m)   Wt 177 lb (80.3 kg)   SpO2 99%   BMI 23.35 kg/m²   Wt Readings from Last 3 Encounters:   05/17/22 177 lb (80.3 kg)   03/23/22 179 lb (81.2 kg)   09/27/21 176 lb 6.4 oz (80 kg)     There were no vitals filed for this visit. Physical Exam  Vitals and nursing note reviewed. Constitutional:       Appearance: Normal appearance. He is well-developed. HENT:      Head: Normocephalic and atraumatic. Right Ear: External ear normal.      Left Ear: External ear normal.      Nose: Nose normal.   Eyes:      Conjunctiva/sclera: Conjunctivae normal.      Pupils: Pupils are equal, round, and reactive to light. Cardiovascular:      Rate and Rhythm: Normal rate and regular rhythm. Heart sounds: Normal heart sounds. No murmur heard. No friction rub. No gallop. Pulmonary:      Effort: Pulmonary effort is normal. No respiratory distress. Breath sounds: Normal breath sounds. No wheezing. Abdominal:      General: Bowel sounds are normal. There is no distension. Palpations: Abdomen is soft. Tenderness: There is no abdominal tenderness.    Musculoskeletal:         General: No tenderness. Normal range of motion. Cervical back: Normal range of motion and neck supple. Skin:     General: Skin is warm and dry. Neurological:      Mental Status: He is alert and oriented to person, place, and time. Deep Tendon Reflexes: Reflexes are normal and symmetric. Psychiatric:         Behavior: Behavior normal.         Thought Content: Thought content normal.         Judgment: Judgment normal.           Assessment   Plan   1. Encounter for well adult exam without abnormal findings  2. Acne vulgaris         Personalized Preventive Plan   Current Health Maintenance Status  Immunization History   Administered Date(s) Administered    COVID-19, J&J, PF, 0.5 mL 03/20/2021    Hepatitis A Ped/Adol (Havrix, Vaqta) 02/13/2008    Influenza, Intradermal, Preservative free 10/12/2015    Influenza, Quadv, IM, PF (6 mo and older Fluzone, Flulaval, Fluarix, and 3 yrs and older Afluria) 10/31/2018, 09/29/2020    PPD Test 07/17/2012, 07/27/2012    Tdap (Boostrix, Adacel) 10/12/2015        Health Maintenance   Topic Date Due    Varicella vaccine (1 of 2 - 2-dose childhood series) Never done    HIV screen  Never done    Hepatitis A vaccine (2 of 2 - 2-dose series) 08/13/2008    Hepatitis C screen  Never done    COVID-19 Vaccine (2 - Booster for Multiplicom series) 05/15/2021    Flu vaccine (Season Ended) 09/01/2022    Depression Monitoring  05/17/2023    DTaP/Tdap/Td vaccine (2 - Td or Tdap) 10/12/2025    Hepatitis B vaccine  Aged Out    Hib vaccine  Aged Out    Meningococcal (ACWY) vaccine  Aged Out    Pneumococcal 0-64 years Vaccine  Aged Out     Recommendations for mInfo Due: see orders and patient instructions/AVS.    No follow-ups on file.

## 2022-06-08 ENCOUNTER — TELEPHONE (OUTPATIENT)
Dept: PULMONOLOGY | Age: 32
End: 2022-06-08

## 2022-06-08 DIAGNOSIS — G47.33 OSA (OBSTRUCTIVE SLEEP APNEA): Primary | ICD-10-CM

## 2022-06-09 ENCOUNTER — PROCEDURE VISIT (OUTPATIENT)
Dept: FAMILY MEDICINE CLINIC | Age: 32
End: 2022-06-09
Payer: COMMERCIAL

## 2022-06-09 VITALS
HEART RATE: 90 BPM | HEIGHT: 73 IN | WEIGHT: 176 LBS | DIASTOLIC BLOOD PRESSURE: 74 MMHG | SYSTOLIC BLOOD PRESSURE: 134 MMHG | OXYGEN SATURATION: 98 % | BODY MASS INDEX: 23.33 KG/M2

## 2022-06-09 DIAGNOSIS — L72.0 EPIDERMAL CYST: Primary | ICD-10-CM

## 2022-06-09 DIAGNOSIS — R20.9 DISTURBANCE OF SKIN SENSATION: ICD-10-CM

## 2022-06-09 PROCEDURE — 11406 EXC TR-EXT B9+MARG >4.0 CM: CPT | Performed by: FAMILY MEDICINE

## 2022-06-16 ENCOUNTER — OFFICE VISIT (OUTPATIENT)
Dept: FAMILY MEDICINE CLINIC | Age: 32
End: 2022-06-16

## 2022-06-16 VITALS
SYSTOLIC BLOOD PRESSURE: 112 MMHG | WEIGHT: 178 LBS | BODY MASS INDEX: 23.59 KG/M2 | HEIGHT: 73 IN | OXYGEN SATURATION: 99 % | HEART RATE: 85 BPM | DIASTOLIC BLOOD PRESSURE: 70 MMHG

## 2022-06-16 DIAGNOSIS — Z48.02 VISIT FOR SUTURE REMOVAL: Primary | ICD-10-CM

## 2022-06-16 PROCEDURE — 99999 PR OFFICE/OUTPT VISIT,PROCEDURE ONLY: CPT | Performed by: FAMILY MEDICINE

## 2022-06-19 NOTE — PROGRESS NOTES
Sebaceous Cyst Excision Procedure Note    Pre-operative Diagnosis: Epidermal cyst    Post-operative Diagnosis: same    Locations:left upper extremity    Indications: This is a 6 x 5 cm epidermal cyst on the left upper arm. Anesthesia: Lidocaine 1% with epinephrine without added sodium bicarbonate    Procedure Details   History of allergy to iodine: no    Patient informed of the risks (including bleeding and infection) and benefits of the   procedure and Verbal informed consent obtained. The lesion and surrounding area was given a sterile prep using betadyne and draped in the usual sterile fashion. An incision was made over the cyst, which was dissected free of the surrounding tissue and removed. The cyst was filled with typical sebaceous material.  The wound was closed with 3-0 Nylon using simple interrupted stitches. Antibiotic ointment and a sterile dressing applied. The specimen was not sent for pathologic examination. The patient tolerated the procedure well. EBL: 10 ml    Findings:  Cyst and cyst contents    Condition:  Stable    Complications:  none. Plan:  1. Instructed to keep the wound dry and covered for 24-48h and clean thereafter. 2. Warning signs of infection were reviewed. 3. Recommended that the patient use OTC acetaminophen as needed for pain. 4. Return for suture removal in 7 days.

## 2022-08-05 ENCOUNTER — TELEPHONE (OUTPATIENT)
Dept: PULMONOLOGY | Age: 32
End: 2022-08-05

## 2022-08-05 NOTE — TELEPHONE ENCOUNTER
Rec's fax from [address] that they are not able to reach patient in regards of PAP set up. They have reached out to the patient multiple times.      I called and left a message with patient

## 2022-08-05 NOTE — LETTER
[facility] Pulmonary, Critical Care, and Sleep  [address] [facility] [address] [address]  Phone: [phone]  Fax: [phone]      ERNST Chris CNP      September 6, 2022 [address]      Dear Juan Matthews:    I am writing because my staff has left multiple messages asking that you call the office to discuss aspects of your treatment, but to date they have not heard from you. As discussed at prior visits, complications of untreated sleep apnea can include:  excessive daytime sleepiness, systemic hypertension, pulmonary hypertension cardiac arrhythmias such as atrial fibrillation, stroke, and increased all - cause mortality. Please note we have you scheduled for a follow up on October 18th, 2022. I hope you are doing well and urge you to call my office at your earliest convenience so that we can discuss your treatment and schedule any important appointments.     Sincerely,        ERNST Chris CNP

## 2022-08-11 NOTE — TELEPHONE ENCOUNTER
Recevied call from pt. Let him know [facility] is trying to get ahold of him. Provided pt with [employer] #.  Rescheduled pts 31-90 day for 10/18/2022

## 2022-08-17 NOTE — TELEPHONE ENCOUNTER
Called and spoke to Dong Lucio and she said that the patient has not called [facility] to schedule set up. Patient called with message left for patient to call back to office.

## 2022-09-02 NOTE — TELEPHONE ENCOUNTER
Noted. Patient did not follow-up as was recommended. Please keep scheduled follow-up appointment on 10/8/22.      Send letter asking patient to call office/DME and keep follow-up appointment

## 2022-10-14 ENCOUNTER — APPOINTMENT (OUTPATIENT)
Dept: CT IMAGING | Age: 32
End: 2022-10-14
Payer: COMMERCIAL

## 2022-10-14 ENCOUNTER — HOSPITAL ENCOUNTER (EMERGENCY)
Age: 32
Discharge: HOME | End: 2022-10-14
Payer: COMMERCIAL

## 2022-10-14 ENCOUNTER — APPOINTMENT (OUTPATIENT)
Dept: GENERAL RADIOLOGY | Age: 32
End: 2022-10-14
Payer: COMMERCIAL

## 2022-10-14 VITALS
HEART RATE: 64 BPM | SYSTOLIC BLOOD PRESSURE: 93 MMHG | WEIGHT: 175 LBS | BODY MASS INDEX: 23.19 KG/M2 | DIASTOLIC BLOOD PRESSURE: 62 MMHG | TEMPERATURE: 98.8 F | RESPIRATION RATE: 13 BRPM | HEIGHT: 73 IN | OXYGEN SATURATION: 97 %

## 2022-10-14 DIAGNOSIS — B34.9 ACUTE VIRAL SYNDROME: Primary | ICD-10-CM

## 2022-10-14 DIAGNOSIS — R50.9 FEVER AND CHILLS: ICD-10-CM

## 2022-10-14 DIAGNOSIS — M54.50 ACUTE BILATERAL LOW BACK PAIN WITHOUT SCIATICA: ICD-10-CM

## 2022-10-14 LAB
A/G RATIO: 2.1 (ref 1.1–2.2)
ALBUMIN SERPL-MCNC: 5 G/DL (ref 3.4–5)
ALP BLD-CCNC: 70 U/L (ref 40–129)
ALT SERPL-CCNC: 23 U/L (ref 10–40)
ANION GAP SERPL CALCULATED.3IONS-SCNC: 11 MMOL/L (ref 3–16)
AST SERPL-CCNC: 26 U/L (ref 15–37)
BASOPHILS ABSOLUTE: 0 K/UL (ref 0–0.2)
BASOPHILS RELATIVE PERCENT: 0.5 %
BILIRUB SERPL-MCNC: 0.5 MG/DL (ref 0–1)
BILIRUBIN URINE: NEGATIVE
BLOOD, URINE: ABNORMAL
BUN BLDV-MCNC: 13 MG/DL (ref 7–20)
C-REACTIVE PROTEIN: 5 MG/L (ref 0–5.1)
CALCIUM SERPL-MCNC: 9.6 MG/DL (ref 8.3–10.6)
CHLORIDE BLD-SCNC: 98 MMOL/L (ref 99–110)
CLARITY: CLEAR
CO2: 25 MMOL/L (ref 21–32)
COLOR: ABNORMAL
CREAT SERPL-MCNC: 1.3 MG/DL (ref 0.9–1.3)
EOSINOPHILS ABSOLUTE: 0 K/UL (ref 0–0.6)
EOSINOPHILS RELATIVE PERCENT: 0.4 %
GFR AFRICAN AMERICAN: >60
GFR NON-AFRICAN AMERICAN: >60
GLUCOSE BLD-MCNC: 115 MG/DL (ref 70–99)
GLUCOSE URINE: NEGATIVE MG/DL
HCT VFR BLD CALC: 43.4 % (ref 40.5–52.5)
HEMOGLOBIN: 15 G/DL (ref 13.5–17.5)
INFLUENZA A: NOT DETECTED
INFLUENZA B: NOT DETECTED
KETONES, URINE: NEGATIVE MG/DL
LACTIC ACID: 0.8 MMOL/L (ref 0.4–2)
LEUKOCYTE ESTERASE, URINE: NEGATIVE
LYMPHOCYTES ABSOLUTE: 1 K/UL (ref 1–5.1)
LYMPHOCYTES RELATIVE PERCENT: 13.2 %
MCH RBC QN AUTO: 29.9 PG (ref 26–34)
MCHC RBC AUTO-ENTMCNC: 34.5 G/DL (ref 31–36)
MCV RBC AUTO: 86.6 FL (ref 80–100)
MICROSCOPIC EXAMINATION: YES
MONOCYTES ABSOLUTE: 0.6 K/UL (ref 0–1.3)
MONOCYTES RELATIVE PERCENT: 7.3 %
NEUTROPHILS ABSOLUTE: 6 K/UL (ref 1.7–7.7)
NEUTROPHILS RELATIVE PERCENT: 78.6 %
NITRITE, URINE: NEGATIVE
PDW BLD-RTO: 13.4 % (ref 12.4–15.4)
PH UA: 7 (ref 5–8)
PLATELET # BLD: 274 K/UL (ref 135–450)
PMV BLD AUTO: 6.8 FL (ref 5–10.5)
POTASSIUM REFLEX MAGNESIUM: 3.8 MMOL/L (ref 3.5–5.1)
PROTEIN UA: NEGATIVE MG/DL
RBC # BLD: 5.01 M/UL (ref 4.2–5.9)
RBC UA: ABNORMAL /HPF (ref 0–4)
SARS-COV-2 RNA, RT PCR: NOT DETECTED
SEDIMENTATION RATE, ERYTHROCYTE: 3 MM/HR (ref 0–15)
SODIUM BLD-SCNC: 134 MMOL/L (ref 136–145)
SPECIFIC GRAVITY UA: 1.01 (ref 1–1.03)
TOTAL PROTEIN: 7.4 G/DL (ref 6.4–8.2)
TROPONIN: <0.01 NG/ML
URINE REFLEX TO CULTURE: ABNORMAL
URINE TYPE: ABNORMAL
UROBILINOGEN, URINE: 0.2 E.U./DL
WBC # BLD: 7.7 K/UL (ref 4–11)
WBC UA: ABNORMAL /HPF (ref 0–5)

## 2022-10-14 PROCEDURE — 87636 SARSCOV2 & INF A&B AMP PRB: CPT

## 2022-10-14 PROCEDURE — 85652 RBC SED RATE AUTOMATED: CPT

## 2022-10-14 PROCEDURE — 84484 ASSAY OF TROPONIN QUANT: CPT

## 2022-10-14 PROCEDURE — 81001 URINALYSIS AUTO W/SCOPE: CPT

## 2022-10-14 PROCEDURE — 85025 COMPLETE CBC W/AUTO DIFF WBC: CPT

## 2022-10-14 PROCEDURE — 86140 C-REACTIVE PROTEIN: CPT

## 2022-10-14 PROCEDURE — 6370000000 HC RX 637 (ALT 250 FOR IP): Performed by: PHYSICIAN ASSISTANT

## 2022-10-14 PROCEDURE — 71045 X-RAY EXAM CHEST 1 VIEW: CPT

## 2022-10-14 PROCEDURE — 74176 CT ABD & PELVIS W/O CONTRAST: CPT

## 2022-10-14 PROCEDURE — 93005 ELECTROCARDIOGRAM TRACING: CPT | Performed by: EMERGENCY MEDICINE

## 2022-10-14 PROCEDURE — 80053 COMPREHEN METABOLIC PANEL: CPT

## 2022-10-14 PROCEDURE — 2580000003 HC RX 258: Performed by: PHYSICIAN ASSISTANT

## 2022-10-14 PROCEDURE — 6360000002 HC RX W HCPCS: Performed by: PHYSICIAN ASSISTANT

## 2022-10-14 PROCEDURE — 83605 ASSAY OF LACTIC ACID: CPT

## 2022-10-14 PROCEDURE — 96374 THER/PROPH/DIAG INJ IV PUSH: CPT

## 2022-10-14 PROCEDURE — 99285 EMERGENCY DEPT VISIT HI MDM: CPT

## 2022-10-14 PROCEDURE — 96361 HYDRATE IV INFUSION ADD-ON: CPT

## 2022-10-14 RX ORDER — 0.9 % SODIUM CHLORIDE 0.9 %
1000 INTRAVENOUS SOLUTION INTRAVENOUS ONCE
Status: COMPLETED | OUTPATIENT
Start: 2022-10-14 | End: 2022-10-14

## 2022-10-14 RX ORDER — ACETAMINOPHEN 325 MG/1
650 TABLET ORAL ONCE
Status: COMPLETED | OUTPATIENT
Start: 2022-10-14 | End: 2022-10-14

## 2022-10-14 RX ORDER — KETOROLAC TROMETHAMINE 30 MG/ML
15 INJECTION, SOLUTION INTRAMUSCULAR; INTRAVENOUS ONCE
Status: COMPLETED | OUTPATIENT
Start: 2022-10-14 | End: 2022-10-14

## 2022-10-14 RX ADMIN — ACETAMINOPHEN 650 MG: 325 TABLET ORAL at 19:28

## 2022-10-14 RX ADMIN — SODIUM CHLORIDE 1000 ML: 9 INJECTION, SOLUTION INTRAVENOUS at 19:29

## 2022-10-14 RX ADMIN — KETOROLAC TROMETHAMINE 15 MG: 30 INJECTION, SOLUTION INTRAMUSCULAR at 20:28

## 2022-10-14 ASSESSMENT — PAIN DESCRIPTION - LOCATION: LOCATION: BACK;CHEST

## 2022-10-14 ASSESSMENT — PAIN SCALES - GENERAL
PAINLEVEL_OUTOF10: 6
PAINLEVEL_OUTOF10: 5
PAINLEVEL_OUTOF10: 6
PAINLEVEL_OUTOF10: 4

## 2022-10-14 ASSESSMENT — ENCOUNTER SYMPTOMS
SHORTNESS OF BREATH: 1
VOMITING: 0
COUGH: 0
SORE THROAT: 0
CHEST TIGHTNESS: 0
NAUSEA: 1
ABDOMINAL PAIN: 0
DIARRHEA: 0
BACK PAIN: 1

## 2022-10-14 ASSESSMENT — PAIN - FUNCTIONAL ASSESSMENT: PAIN_FUNCTIONAL_ASSESSMENT: 0-10

## 2022-10-15 LAB
EKG ATRIAL RATE: 109 BPM
EKG DIAGNOSIS: NORMAL
EKG P AXIS: 74 DEGREES
EKG P-R INTERVAL: 140 MS
EKG Q-T INTERVAL: 318 MS
EKG QRS DURATION: 96 MS
EKG QTC CALCULATION (BAZETT): 428 MS
EKG R AXIS: 89 DEGREES
EKG T AXIS: 57 DEGREES
EKG VENTRICULAR RATE: 109 BPM

## 2022-10-15 PROCEDURE — 93010 ELECTROCARDIOGRAM REPORT: CPT | Performed by: INTERNAL MEDICINE

## 2022-10-15 NOTE — ED PROVIDER NOTES
**ADVANCED PRACTICE PROVIDER, I HAVE EVALUATED THIS [facility] Marylou Aziza  ED  EMERGENCY DEPARTMENT ENCOUNTER      Pt Name: Rivera Saba  ANI:[id number]  [place] 1990  Date of evaluation: 10/14/2022  Provider: DEYVI Candelaria      Chief Complaint:    Chief Complaint   Patient presents with    Fever     102.4 at triage; Chest Pain     7/10 radiating pain from back    Shortness of Breath     Reports \"can't get a deep breath for a couple days\"    Tachycardia     122 at triage    Back Pain     Starts mid back and radiates to chest pain; patient denies cardiac hx          Nursing Notes, Past Medical Hx, Past Surgical Hx, Social Hx, Allergies, and Family Hx were all reviewed and agreed with or any disagreements were addressed in the HPI.    HPI: (Location, Duration, Timing, Severity, Quality, Assoc Sx, Context, Modifying factors)    Chief Complaint of chest back pain, chills, shortness of breath and chest pain. This is a  32 y.o. male who presents via private vehicle with multiple complaints. The patient has past medical history of bipolar and panic attacks. The patient states he woke up this morning with back pain that is worse with movement. He also reports chills. He states he was at work today and felt nauseous with slight shortness of breath secondary to the back pain. He describes the pain as 7/10 and states it radiates from his mid back into his chest.  He denies any lightheadedness, headaches, or dizziness. He did have an episode of nausea but no vomiting. He states he is eating and drinking normally. No abdominal pain. No dysuria or hematuria. Bowel movements are normal.  Denies any alcohol or drug use. 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 No pertinent surgical history.     Medications:  Previous Medications    DOXYCYCLINE HYCLATE (VIBRA-TABS) 100 MG TABLET    Take 1 tablet by mouth 2 times daily    LAMOTRIGINE (LAMICTAL) 100 MG TABLET    Take 1 tablet by mouth nightly Start in 2 weeks after completion of 50 mg dose         Review of Systems:  (2-9 systems needed)  Review of Systems   Constitutional:  Positive for fever. Negative for chills. HENT:  Negative for congestion, nosebleeds and sore throat. Eyes:  Negative for visual disturbance. Respiratory:  Positive for shortness of breath. Negative for cough and chest tightness. Cardiovascular:  Positive for chest pain. Negative for palpitations. Gastrointestinal:  Positive for nausea. Negative for abdominal pain, diarrhea and vomiting. Genitourinary:  Negative for dysuria, frequency, hematuria and urgency. Musculoskeletal:  Positive for back pain. Negative for neck pain. Skin:  Negative for rash. Neurological:  Negative for dizziness, weakness, light-headedness and headaches. \"Positives and Pertinent negatives as per HPI\"    Physical Exam:  Physical Exam  Vitals and nursing note reviewed. Constitutional:       Appearance: Normal appearance. He is not diaphoretic. HENT:      Head: Normocephalic and atraumatic. Nose: Nose normal.      Mouth/Throat:      Mouth: Mucous membranes are moist.      Pharynx: Oropharynx is clear. Eyes:      General:         Right eye: No discharge. Left eye: No discharge. Extraocular Movements: Extraocular movements intact. Pupils: Pupils are equal, round, and reactive to light. Cardiovascular:      Rate and Rhythm: Normal rate and regular rhythm. Pulses: Normal pulses. Heart sounds: Normal heart sounds. No murmur heard. No friction rub. No gallop. Pulmonary:      Effort: Pulmonary effort is normal. No respiratory distress. Breath sounds: Normal breath sounds. No stridor. No wheezing, rhonchi or rales. Abdominal:      General: Abdomen is flat.       Palpations: Abdomen is soft. Tenderness: There is no abdominal tenderness. There is no right CVA tenderness, left CVA tenderness, guarding or rebound. Musculoskeletal:         General: Normal range of motion. Cervical back: Normal, normal range of motion and neck supple. Thoracic back: Normal.      Lumbar back: Normal.   Skin:     General: Skin is warm and dry. Coloration: Skin is not pale. Neurological:      Mental Status: He is alert and oriented to person, place, and time. Psychiatric:         Mood and Affect: Mood normal.         Behavior: Behavior normal.       MEDICAL DECISION MAKING    Vitals:    Vitals:    10/14/22 2152 10/14/22 2227 10/14/22 2236 10/14/22 2257   BP:  96/61  (!) 91/54   Pulse:  79 73 76   Resp:  15 12 17   Temp: 98.8 °F (37.1 °C)      TempSrc: Oral      SpO2:  97% 97% 97%   Weight:       Height:           LABS:  Labs Reviewed   COMPREHENSIVE METABOLIC PANEL W/ REFLEX TO MG FOR LOW K - Abnormal; Notable for the following components:       Result Value    Sodium 134 (*)     Chloride 98 (*)     Glucose 115 (*)     All other components within normal limits   URINALYSIS WITH REFLEX TO CULTURE - Abnormal; Notable for the following components:    Blood, Urine MODERATE (*)     All other components within normal limits   MICROSCOPIC URINALYSIS - Abnormal; Notable for the following components:    RBC, UA 21-50 (*)     All other components within normal limits   COVID-19 & INFLUENZA COMBO   CBC WITH AUTO DIFFERENTIAL   TROPONIN   LACTIC ACID   C-REACTIVE PROTEIN   SEDIMENTATION RATE   TROPONIN        Remainder of labs reviewed and were negative at this time or not returned at the time of this note.     RADIOLOGY:   Non-plain film images such as CT, Ultrasound and MRI are read by the radiologist. DEYVI Kenyon have directly visualized the radiologic plain film image(s) with the below findings:      Interpretation per the Radiologist below, if available at the time of this note:    CT ABDOMEN PELVIS WO CONTRAST Additional Contrast? None   Final Result   No evidence of obstructive uropathy. No evidence of renal stones. Normal appendix. Mild constipation. Normal urinary bladder. XR CHEST PORTABLE   Final Result   No radiographic evidence of acute pulmonary abnormality seen. CT ABDOMEN PELVIS WO CONTRAST Additional Contrast? None    Result Date: 10/14/2022  EXAMINATION: STONE PROTOCOL CT OF THE ABDOMEN AND PELVIS 10/14/2022 8:33 pm TECHNIQUE: CT of the abdomen and pelvis was performed without the administration of intravenous contrast. Multiplanar reformatted images are provided for review. Automated exposure control, iterative reconstruction, and/or weight based adjustment of the mA/kV was utilized to reduce the radiation dose to as low as reasonably achievable. COMPARISON: None HISTORY: ORDERING SYSTEM PROVIDED HISTORY: concern for kidney stone TECHNOLOGIST PROVIDED HISTORY: Reason for exam:->concern for kidney stone Additional Contrast?->None Decision Support Exception - unselect if not a suspected or confirmed emergency medical condition->Emergency Medical Condition (MA) Reason for Exam: left and right flank pain since this morning Relevant Medical/Surgical History: No surgery nor hx kidney stones FINDINGS: LOWER CHEST:  Visualized portion of the lower chest demonstrates no acute abnormality. KIDNEYS AND URINARY TRACT: No renal calculi are identified. There is no evidence for hydronephrosis. The ureters are of normal course and caliber. ORGANS: Visualized portions of the unenhanced liver, spleen, pancreas, and adrenal glands demonstrate no acute abnormality. No inflammatory changes in the visualized portion of the gallbladder fossa. GI/BOWEL: No bowel obstruction. No evidence of acute appendicitis. Appendix well visualized and appears normal.  Evidence of mild constipation. Small bowel appears unremarkable.  PELVIS: The bladder and pelvic organs are unremarkable. PERITONEUM/RETROPERITONEUM: No lymphadenopathy is noted. BONES/SOFT TISSUES: The osseous structures demonstrate no acute abnormality. No evidence of obstructive uropathy. No evidence of renal stones. Normal appendix. Mild constipation. Normal urinary bladder. XR CHEST PORTABLE    Result Date: 10/14/2022  EXAMINATION: ONE XRAY VIEW OF THE CHEST 10/14/2022 7:16 pm COMPARISON: 05/30/2018. HISTORY: ORDERING SYSTEM PROVIDED HISTORY: sob TECHNOLOGIST PROVIDED HISTORY: Reason for exam:->sob Reason for Exam: sob FINDINGS: The cardiac silhouette appears within normal limits. No confluent airspace opacity, pleural effusion or pneumothorax is seen. No radiographic evidence of acute pulmonary abnormality seen. MEDICAL DECISION MAKING / ED COURSE:      PROCEDURES:   Procedures    None    Patient was given:  Medications   0.9 % sodium chloride bolus (0 mLs IntraVENous Stopped 10/14/22 2029)   acetaminophen (TYLENOL) tablet 650 mg (650 mg Oral Given 10/14/22 1928)   ketorolac (TORADOL) injection 15 mg (15 mg IntraVENous Given 10/14/22 2028)       Patient seen and evaluated in the emergency department today for multiple complaints. Upon arrival he is tachycardic with a fever. He is given IV fluids, Tylenol and Toradol for relief. Upon reevaluation patient's fever has completely subsided and tachycardia resolved. His blood pressure did drop slightly from 126/97 to 91/54. Work-up results include:  CBC without evidence of leukocytosis or acute anemia  CMP with sodium 134, chloride 98, glucose 115. No other acute electrolyte abnormality. Renal function well-maintained. No transaminitis. Lactic acid is 0.8  Urinalysis with moderate amount of blood but no evidence of infection. Troponin is less than 0.01  COVID and flu negative. Given the patient's flank pain and blood in urine we did obtain a CT abdomen and pelvis to evaluate for possible kidney stone.   CT abdomen pelvis shows no evidence of intra-abdominal abnormality including no kidney stone and appendix appears normal.  I did consider spinal epidural abscess given complaints of back pain and fever. However CRP and ESR are WNL. He also denies drug use and does not have any midline spinal tenderness. Denies any numbness or tingling in legs or other red flag symptoms. At this point I suspect viral syndrome and feel with the patient's improvement in symptoms and reassuring workup no further work-up is indicated at this time and he may be safely discharged home. I discussed very strict return precautions with the patient should he develop any new or worsening symptoms he will return to the emergency department for further evaluation and treatment. Otherwise he is in agreement treatment plan and feels comfortable with discharge home. All questions are answered and the patient is discharged home in good condition. The patient tolerated their visit well. I evaluated the patient. The physician was available for consultation as needed. The patient and / or the family were informed of the results of any tests, a time was given to answer questions, a plan was proposed and they agreed with plan. I am the Primary Clinician of Record. CLINICAL IMPRESSION:  1. Acute viral syndrome    2. Fever and chills    3.  Acute bilateral low back pain without sciatica      Results for orders placed or performed during the hospital encounter of 10/14/22   COVID-19 & Influenza Combo    Specimen: Nasopharyngeal Swab   Result Value Ref Range    SARS-CoV-2 RNA, RT PCR NOT DETECTED NOT DETECTED    INFLUENZA A NOT DETECTED NOT DETECTED    INFLUENZA B NOT DETECTED NOT DETECTED   CBC with Auto Differential   Result Value Ref Range    WBC 7.7 4.0 - 11.0 K/uL    RBC 5.01 4.20 - 5.90 M/uL    Hemoglobin 15.0 13.5 - 17.5 g/dL    Hematocrit 43.4 40.5 - 52.5 %    MCV 86.6 80.0 - 100.0 fL    MCH 29.9 26.0 - 34.0 pg    MCHC 34.5 31.0 - 36.0 g/dL    RDW 13.4 12.4 - 15.4 %    Platelets 566 459 - 600 K/uL    MPV 6.8 5.0 - 10.5 fL    Neutrophils % 78.6 %    Lymphocytes % 13.2 %    Monocytes % 7.3 %    Eosinophils % 0.4 %    Basophils % 0.5 %    Neutrophils Absolute 6.0 1.7 - 7.7 K/uL    Lymphocytes Absolute 1.0 1.0 - 5.1 K/uL    Monocytes Absolute 0.6 0.0 - 1.3 K/uL    Eosinophils Absolute 0.0 0.0 - 0.6 K/uL    Basophils Absolute 0.0 0.0 - 0.2 K/uL   Comprehensive Metabolic Panel w/ Reflex to MG   Result Value Ref Range    Sodium 134 (L) 136 - 145 mmol/L    Potassium reflex Magnesium 3.8 3.5 - 5.1 mmol/L    Chloride 98 (L) 99 - 110 mmol/L    CO2 25 21 - 32 mmol/L    Anion Gap 11 3 - 16    Glucose 115 (H) 70 - 99 mg/dL    BUN 13 7 - 20 mg/dL    Creatinine 1.3 0.9 - 1.3 mg/dL    GFR Non-African American >60 >60    GFR African American >60 >60    Calcium 9.6 8.3 - 10.6 mg/dL    Total Protein 7.4 6.4 - 8.2 g/dL    Albumin 5.0 3.4 - 5.0 g/dL    Albumin/Globulin Ratio 2.1 1.1 - 2.2    Total Bilirubin 0.5 0.0 - 1.0 mg/dL    Alkaline Phosphatase 70 40 - 129 U/L    ALT 23 10 - 40 U/L    AST 26 15 - 37 U/L   Urinalysis with Reflex to Culture    Specimen: Urine   Result Value Ref Range    Color, UA Straw Straw/Yellow    Clarity, UA Clear Clear    Glucose, Ur Negative Negative mg/dL    Bilirubin Urine Negative Negative    Ketones, Urine Negative Negative mg/dL    Specific Gravity, UA 1.010 1.005 - 1.030    Blood, Urine MODERATE (A) Negative    pH, UA 7.0 5.0 - 8.0    Protein, UA Negative Negative mg/dL    Urobilinogen, Urine 0.2 <2.0 E.U./dL    Nitrite, Urine Negative Negative    Leukocyte Esterase, Urine Negative Negative    Microscopic Examination YES     Urine Type NotGiven     Urine Reflex to Culture Not Indicated    Troponin   Result Value Ref Range    Troponin <0.01 <0.01 ng/mL   Lactic Acid   Result Value Ref Range    Lactic Acid 0.8 0.4 - 2.0 mmol/L   Microscopic Urinalysis   Result Value Ref Range    WBC, UA None seen 0 - 5 /HPF    RBC, UA 21-50 (A) 0 - 4 /HPF   C-Reactive Protein Result Value Ref Range    CRP 5.0 0.0 - 5.1 mg/L   Sedimentation Rate   Result Value Ref Range    Sed Rate 3 0 - 15 mm/Hr         I estimate there is LOW risk for EPIGLOTTITIS, PNEUMONIA, MENINGITIS, OR URINARY TRACT INFECTION, thus I consider the discharge disposition reasonable. Also, there is no evidence or peritonitis, sepsis, or toxicity. Kusum Bhatt and I have discussed the diagnosis and risks, and we agree with discharging home to follow-up with their primary doctor. We also discussed returning to the Emergency Department immediately if new or worsening symptoms occur. We have discussed the symptoms which are most concerning (e.g., changing or worsening pain, trouble swallowing or breating, neck stiffness, fever) that necessitate immediate return. Final Impression    1. Acute viral syndrome    2. Fever and chills    3. Acute bilateral low back pain without sciatica        Discharge Vital Signs:  Blood pressure 93/62, pulse 64, temperature 98.8 °F (37.1 °C), temperature source Oral, resp. rate 13, height 6' 1\" (1.854 m), weight 175 lb (79.4 kg), SpO2 97 %.       DISPOSITION Decision To Discharge 10/14/2022 11:18:12 PM      PATIENT REFERRED TO:  [facility]  ED  [address] [facility] [address]  Go to   If symptoms worsen    Maxx Hutton MD  [address] [facility] [address]  [phone]          DISCHARGE MEDICATIONS:  New Prescriptions    No medications on file       DISCONTINUED MEDICATIONS:  Discontinued Medications    No medications on file              (Please note the MDM and HPI sections of this note were completed with a voice recognition program.  Efforts were made to edit the dictations but occasionally words are mis-transcribed.)    Electronically signed, Meka Duenas, [id number],          Meka Duenas, [id number]  10/14/22 8725

## 2022-10-15 NOTE — DISCHARGE INSTRUCTIONS
You were seen in the emergency department today for fever and back pain. Your work-up is very reassuring. Please follow-up with your primary care physician on Monday for further evaluation and treatment. Otherwise if you develop any new or worsening symptoms over the weekend return to the emergency department for reevaluation. Continue taking ibuprofen and Tylenol for fever and pain as needed.

## 2022-10-15 NOTE — ED NOTES
Kiara Merrill for d/c. Stable and ambulatory w/ family. All questions asked and answered. No further questions at this time. Left w/ all belongings.       Naty Ross RN  10/14/22 5976

## 2022-10-17 ENCOUNTER — TELEPHONE (OUTPATIENT)
Dept: PULMONOLOGY | Age: 32
End: 2022-10-17

## 2022-10-17 NOTE — TELEPHONE ENCOUNTER
Gerry ATKINSON  Patient Appointment Schedule Request Pool 2 days ago     BY  Appointment For: Pepe Higginssavannah ([id number])  Visit Type: VV SPECIAL USE CASE ([id number])     11/22/2022   8:20 AM  20 mins. ERNST Toscano CNP  [facility]     Patient Comments:  Had to order a different mask to assist with therapy  ----------------------------------  This appointment rescheduled from:  10/18/2022   9:00 AM  20 mins.   ERNST Toscano CNP  [facility]      Patient was set up 9/13/22 with resmed s11

## 2022-10-18 ENCOUNTER — CARE COORDINATION (OUTPATIENT)
Dept: CARE COORDINATION | Age: 32
End: 2022-10-18

## 2022-10-18 ENCOUNTER — OFFICE VISIT (OUTPATIENT)
Dept: FAMILY MEDICINE CLINIC | Age: 32
End: 2022-10-18
Payer: COMMERCIAL

## 2022-10-18 VITALS
WEIGHT: 178 LBS | OXYGEN SATURATION: 98 % | HEART RATE: 74 BPM | BODY MASS INDEX: 23.48 KG/M2 | SYSTOLIC BLOOD PRESSURE: 98 MMHG | DIASTOLIC BLOOD PRESSURE: 64 MMHG

## 2022-10-18 DIAGNOSIS — R31.21 ASYMPTOMATIC MICROSCOPIC HEMATURIA: ICD-10-CM

## 2022-10-18 DIAGNOSIS — K59.00 CONSTIPATION, UNSPECIFIED CONSTIPATION TYPE: ICD-10-CM

## 2022-10-18 DIAGNOSIS — R09.1 PLEURISY: ICD-10-CM

## 2022-10-18 DIAGNOSIS — R10.9 BILATERAL FLANK PAIN: Primary | ICD-10-CM

## 2022-10-18 LAB
BACTERIA: ABNORMAL /HPF
BILIRUBIN URINE: NEGATIVE
BLOOD, URINE: ABNORMAL
CLARITY: CLEAR
COLOR: YELLOW
EPITHELIAL CELLS, UA: 0 /HPF (ref 0–5)
GLUCOSE URINE: NEGATIVE MG/DL
HYALINE CASTS: 0 /LPF (ref 0–8)
KETONES, URINE: NEGATIVE MG/DL
LEUKOCYTE ESTERASE, URINE: NEGATIVE
MICROSCOPIC EXAMINATION: YES
MUCUS: PRESENT
NITRITE, URINE: NEGATIVE
PH UA: 7.5 (ref 5–8)
PROTEIN UA: NEGATIVE MG/DL
RBC UA: 30 /HPF (ref 0–4)
SPECIFIC GRAVITY UA: 1.02 (ref 1–1.03)
URINE TYPE: ABNORMAL
UROBILINOGEN, URINE: 0.2 E.U./DL
WBC UA: 0 /HPF (ref 0–5)

## 2022-10-18 PROCEDURE — 81003 URINALYSIS AUTO W/O SCOPE: CPT | Performed by: NURSE PRACTITIONER

## 2022-10-18 PROCEDURE — 99214 OFFICE O/P EST MOD 30 MIN: CPT | Performed by: NURSE PRACTITIONER

## 2022-10-18 RX ORDER — CIPROFLOXACIN 500 MG/1
500 TABLET, FILM COATED ORAL 2 TIMES DAILY
Qty: 14 TABLET | Refills: 0 | Status: SHIPPED | OUTPATIENT
Start: 2022-10-18 | End: 2022-10-25

## 2022-10-18 ASSESSMENT — ENCOUNTER SYMPTOMS
WHEEZING: 0
SHORTNESS OF BREATH: 0
CONSTIPATION: 1
BACK PAIN: 1
SORE THROAT: 1
COUGH: 0

## 2022-10-18 NOTE — PROGRESS NOTES
Patient: Malik Kendrick is a 32 y.o. male who presents today with the following Chief Complaint(s):  Chief Complaint   Patient presents with    Follow-Up from Hospital     Fever has broke, back pain when breathing sneezing or coughing is the main issue still          HPI  Here for ER follow up- from 10/14/2022- was having fever, and pain in his lower back when he takes a deep breath, sneezes or coughs. He states they gave him some medicine in the ER- tylenol and toradol and it helped the pain a little. Chest Xray and CT scan were normal- except for mild constipation. Blood work was normal. There was moderate blood on the urinalysis but no culture was done- he denies seeing any blood in the urine. He denies any penial discharge. He denies any urinary symptoms. Denies any risk for STD. Still has the pain in his lower back when he takes a deep breath. No positions make it better or worse. COVID and Flu were negative on 10/14/2022      Current Outpatient Medications   Medication Sig Dispense Refill    ciprofloxacin (CIPRO) 500 MG tablet Take 1 tablet by mouth 2 times daily for 7 days 14 tablet 0    lamoTRIgine (LAMICTAL) 100 MG tablet Take 1 tablet by mouth nightly Start in 2 weeks after completion of 50 mg dose (Patient taking differently: Take 250 mg by mouth nightly) 30 tablet 0     No current facility-administered medications for this visit. Patient's past medical history, surgical history, family history, medications,  andallergies  were all reviewed and updated as appropriate today. Review of Systems   Constitutional:  Negative for fever (had a fever but that has gone away). Chills: clammy feeling off and on. HENT:  Positive for congestion (morning and evening) and sore throat (today). Respiratory:  Negative for cough, shortness of breath and wheezing. Hurts to take a deep breath   Cardiovascular:  Negative for chest pain and palpitations.    Gastrointestinal:  Positive for constipation (on imaging from ER). Genitourinary: Negative.         +moderate blood from urinalysis in ER and today in office on poct urinalysis   Musculoskeletal:  Positive for back pain and myalgias (off and on). Neurological:  Negative for headaches. Physical Exam  Vitals and nursing note reviewed. Constitutional:       Appearance: Normal appearance. He is well-developed. HENT:      Head: Normocephalic and atraumatic. Right Ear: External ear normal.      Left Ear: External ear normal.      Nose: Nose normal.      Mouth/Throat:      Pharynx: No oropharyngeal exudate or posterior oropharyngeal erythema. Eyes:      Conjunctiva/sclera: Conjunctivae normal.   Cardiovascular:      Rate and Rhythm: Normal rate and regular rhythm. Heart sounds: Normal heart sounds. No murmur heard. Pulmonary:      Effort: Pulmonary effort is normal. No respiratory distress. Breath sounds: Normal breath sounds. No wheezing or rales. Musculoskeletal:         General: Normal range of motion. Cervical back: Normal range of motion. Lymphadenopathy:      Cervical: No cervical adenopathy. Skin:     General: Skin is warm and dry. Neurological:      General: No focal deficit present. Mental Status: He is alert and oriented to person, place, and time. Deep Tendon Reflexes: Reflexes are normal and symmetric. Psychiatric:         Mood and Affect: Mood normal.         Behavior: Behavior normal.         Thought Content: Thought content normal.         Judgment: Judgment normal.     Vitals:    10/18/22 1031   BP: 98/64   Pulse: 74   SpO2: 98%       Assessment:  Encounter Diagnoses   Name Primary? Bilateral flank pain Yes    Asymptomatic microscopic hematuria     Constipation, unspecified constipation type     Pleurisy        Plan:  1. Bilateral flank pain    - Culture, Urine  - Urinalysis  - ciprofloxacin (CIPRO) 500 MG tablet; Take 1 tablet by mouth 2 times daily for 7 days  Dispense: 14 tablet;  Refill: 0    2. Asymptomatic microscopic hematuria  Will see what culture shows  Could be from viral infection  - Culture, Urine  - Urinalysis  - ciprofloxacin (CIPRO) 500 MG tablet; Take 1 tablet by mouth 2 times daily for 7 days  Dispense: 14 tablet; Refill: 0    3. Constipation, unspecified constipation type    Mild constipation on imaging from 10/14/2022- discussed how this can cause some low back pain- may want to try miralax daily for a few days    4. Pleurisy  Discussed pleurisy and to take ibuprofen 600-800mg every 6-8 hours over the next several days and see if that helps        No follow-ups on file.

## 2022-10-18 NOTE — LETTER
[address]  [address] [facility] [place] [address]  Phone: [phone]  Fax: [phone]    ERNST Basurto - [facility]        October 18, 2022     Patient: Kusum Bhatt   YOB: 1990   Date of Visit: 10/18/2022       To Whom it May Concern:    Blanquita Barnard was seen in my clinic on 10/18/2022. He may return to work on 10/20/2022. If you have any questions or concerns, please don't hesitate to call.     Sincerely,         ERNST Basurto - CNP

## 2022-10-19 LAB — URINE CULTURE, ROUTINE: NORMAL

## 2022-10-20 DIAGNOSIS — R31.21 ASYMPTOMATIC MICROSCOPIC HEMATURIA: Primary | ICD-10-CM

## 2022-10-21 ENCOUNTER — TELEPHONE (OUTPATIENT)
Dept: FAMILY MEDICINE CLINIC | Age: 32
End: 2022-10-21

## 2022-10-21 RX ORDER — METHOCARBAMOL 750 MG/1
750 TABLET, FILM COATED ORAL 4 TIMES DAILY
Qty: 40 TABLET | Refills: 0 | Status: SHIPPED | OUTPATIENT
Start: 2022-10-21 | End: 2022-10-31

## 2022-10-28 ENCOUNTER — TELEMEDICINE (OUTPATIENT)
Dept: FAMILY MEDICINE CLINIC | Age: 32
End: 2022-10-28
Payer: COMMERCIAL

## 2022-10-28 DIAGNOSIS — N23 RENAL COLIC ON RIGHT SIDE: Primary | ICD-10-CM

## 2022-10-28 PROCEDURE — 99214 OFFICE O/P EST MOD 30 MIN: CPT | Performed by: FAMILY MEDICINE

## 2022-10-28 RX ORDER — PHENAZOPYRIDINE HYDROCHLORIDE 200 MG/1
200 TABLET, FILM COATED ORAL 3 TIMES DAILY PRN
Qty: 9 TABLET | Refills: 0 | Status: SHIPPED | OUTPATIENT
Start: 2022-10-28 | End: 2022-10-31

## 2022-10-28 RX ORDER — TAMSULOSIN HYDROCHLORIDE 0.4 MG/1
0.4 CAPSULE ORAL DAILY
Qty: 30 CAPSULE | Refills: 0 | Status: SHIPPED | OUTPATIENT
Start: 2022-10-28 | End: 2022-11-21

## 2022-10-28 RX ORDER — TRAMADOL HYDROCHLORIDE 50 MG/1
50 TABLET ORAL EVERY 4 HOURS PRN
Qty: 18 TABLET | Refills: 0 | Status: SHIPPED | OUTPATIENT
Start: 2022-10-28 | End: 2022-10-31

## 2022-10-29 ENCOUNTER — TELEPHONE (OUTPATIENT)
Dept: FAMILY MEDICINE CLINIC | Age: 32
End: 2022-10-29

## 2022-10-29 ENCOUNTER — APPOINTMENT (OUTPATIENT)
Dept: ULTRASOUND IMAGING | Age: 32
End: 2022-10-29
Payer: COMMERCIAL

## 2022-10-29 ENCOUNTER — APPOINTMENT (OUTPATIENT)
Dept: CT IMAGING | Age: 32
End: 2022-10-29
Payer: COMMERCIAL

## 2022-10-29 ENCOUNTER — HOSPITAL ENCOUNTER (EMERGENCY)
Age: 32
Discharge: HOME | End: 2022-10-29
Attending: EMERGENCY MEDICINE
Payer: COMMERCIAL

## 2022-10-29 VITALS
HEIGHT: 73 IN | TEMPERATURE: 98.5 F | SYSTOLIC BLOOD PRESSURE: 105 MMHG | WEIGHT: 174 LBS | HEART RATE: 81 BPM | DIASTOLIC BLOOD PRESSURE: 66 MMHG | RESPIRATION RATE: 16 BRPM | OXYGEN SATURATION: 97 % | BODY MASS INDEX: 23.06 KG/M2

## 2022-10-29 DIAGNOSIS — N45.3 EPIDIDYMO-ORCHITIS: Primary | ICD-10-CM

## 2022-10-29 LAB
A/G RATIO: 2.4 (ref 1.1–2.2)
ALBUMIN SERPL-MCNC: 4.7 G/DL (ref 3.4–5)
ALP BLD-CCNC: 72 U/L (ref 40–129)
ALT SERPL-CCNC: 17 U/L (ref 10–40)
ANION GAP SERPL CALCULATED.3IONS-SCNC: 9 MMOL/L (ref 3–16)
AST SERPL-CCNC: 20 U/L (ref 15–37)
BASOPHILS ABSOLUTE: 0 K/UL (ref 0–0.2)
BASOPHILS RELATIVE PERCENT: 0.4 %
BILIRUB SERPL-MCNC: 0.5 MG/DL (ref 0–1)
BILIRUBIN URINE: ABNORMAL
BLOOD, URINE: ABNORMAL
BUN BLDV-MCNC: 13 MG/DL (ref 7–20)
CALCIUM SERPL-MCNC: 9.3 MG/DL (ref 8.3–10.6)
CHLORIDE BLD-SCNC: 98 MMOL/L (ref 99–110)
CLARITY: CLEAR
CO2: 26 MMOL/L (ref 21–32)
COLOR: ABNORMAL
CREAT SERPL-MCNC: 1.3 MG/DL (ref 0.9–1.3)
EOSINOPHILS ABSOLUTE: 0 K/UL (ref 0–0.6)
EOSINOPHILS RELATIVE PERCENT: 0.2 %
GFR SERPL CREATININE-BSD FRML MDRD: >60 ML/MIN/{1.73_M2}
GLUCOSE BLD-MCNC: 164 MG/DL (ref 70–99)
GLUCOSE URINE: ABNORMAL MG/DL
HCT VFR BLD CALC: 42.8 % (ref 40.5–52.5)
HEMOGLOBIN: 14.6 G/DL (ref 13.5–17.5)
KETONES, URINE: ABNORMAL MG/DL
LEUKOCYTE ESTERASE, URINE: ABNORMAL
LYMPHOCYTES ABSOLUTE: 1.2 K/UL (ref 1–5.1)
LYMPHOCYTES RELATIVE PERCENT: 10.1 %
MCH RBC QN AUTO: 29.5 PG (ref 26–34)
MCHC RBC AUTO-ENTMCNC: 34.2 G/DL (ref 31–36)
MCV RBC AUTO: 86.2 FL (ref 80–100)
MICROSCOPIC EXAMINATION: YES
MONOCYTES ABSOLUTE: 0.7 K/UL (ref 0–1.3)
MONOCYTES RELATIVE PERCENT: 5.7 %
NEUTROPHILS ABSOLUTE: 9.8 K/UL (ref 1.7–7.7)
NEUTROPHILS RELATIVE PERCENT: 83.6 %
NITRITE, URINE: ABNORMAL
PDW BLD-RTO: 13.3 % (ref 12.4–15.4)
PH UA: ABNORMAL (ref 5–8)
PLATELET # BLD: 285 K/UL (ref 135–450)
PMV BLD AUTO: 6.8 FL (ref 5–10.5)
POTASSIUM REFLEX MAGNESIUM: 3.6 MMOL/L (ref 3.5–5.1)
PROTEIN UA: ABNORMAL MG/DL
RBC # BLD: 4.96 M/UL (ref 4.2–5.9)
RBC UA: ABNORMAL /HPF (ref 0–4)
SODIUM BLD-SCNC: 133 MMOL/L (ref 136–145)
SPECIFIC GRAVITY UA: ABNORMAL (ref 1–1.03)
TOTAL PROTEIN: 6.7 G/DL (ref 6.4–8.2)
URINE REFLEX TO CULTURE: ABNORMAL
URINE TYPE: ABNORMAL
UROBILINOGEN, URINE: ABNORMAL E.U./DL
WBC # BLD: 11.7 K/UL (ref 4–11)
WBC UA: ABNORMAL /HPF (ref 0–5)

## 2022-10-29 PROCEDURE — 96372 THER/PROPH/DIAG INJ SC/IM: CPT

## 2022-10-29 PROCEDURE — 76870 US EXAM SCROTUM: CPT

## 2022-10-29 PROCEDURE — 96374 THER/PROPH/DIAG INJ IV PUSH: CPT

## 2022-10-29 PROCEDURE — 99284 EMERGENCY DEPT VISIT MOD MDM: CPT

## 2022-10-29 PROCEDURE — 6360000002 HC RX W HCPCS: Performed by: EMERGENCY MEDICINE

## 2022-10-29 PROCEDURE — 81001 URINALYSIS AUTO W/SCOPE: CPT

## 2022-10-29 PROCEDURE — 80053 COMPREHEN METABOLIC PANEL: CPT

## 2022-10-29 PROCEDURE — 74176 CT ABD & PELVIS W/O CONTRAST: CPT

## 2022-10-29 PROCEDURE — 87591 N.GONORRHOEAE DNA AMP PROB: CPT

## 2022-10-29 PROCEDURE — 2580000003 HC RX 258: Performed by: EMERGENCY MEDICINE

## 2022-10-29 PROCEDURE — 87491 CHLMYD TRACH DNA AMP PROBE: CPT

## 2022-10-29 PROCEDURE — 93975 VASCULAR STUDY: CPT

## 2022-10-29 PROCEDURE — 85025 COMPLETE CBC W/AUTO DIFF WBC: CPT

## 2022-10-29 RX ORDER — 0.9 % SODIUM CHLORIDE 0.9 %
1000 INTRAVENOUS SOLUTION INTRAVENOUS ONCE
Status: COMPLETED | OUTPATIENT
Start: 2022-10-29 | End: 2022-10-29

## 2022-10-29 RX ORDER — KETOROLAC TROMETHAMINE 30 MG/ML
15 INJECTION, SOLUTION INTRAMUSCULAR; INTRAVENOUS ONCE
Status: COMPLETED | OUTPATIENT
Start: 2022-10-29 | End: 2022-10-29

## 2022-10-29 RX ORDER — CEFTRIAXONE 1 G/1
500 INJECTION, POWDER, FOR SOLUTION INTRAMUSCULAR; INTRAVENOUS ONCE
Status: COMPLETED | OUTPATIENT
Start: 2022-10-29 | End: 2022-10-29

## 2022-10-29 RX ORDER — LEVOFLOXACIN 500 MG/1
500 TABLET, FILM COATED ORAL DAILY
Qty: 10 TABLET | Refills: 0 | Status: SHIPPED | OUTPATIENT
Start: 2022-10-29 | End: 2022-11-08

## 2022-10-29 RX ADMIN — CEFTRIAXONE SODIUM 500 MG: 1 INJECTION, POWDER, FOR SOLUTION INTRAMUSCULAR; INTRAVENOUS at 19:17

## 2022-10-29 RX ADMIN — KETOROLAC TROMETHAMINE 15 MG: 30 INJECTION, SOLUTION INTRAMUSCULAR at 17:36

## 2022-10-29 RX ADMIN — SODIUM CHLORIDE 1000 ML: 9 INJECTION, SOLUTION INTRAVENOUS at 17:40

## 2022-10-29 ASSESSMENT — PAIN SCALES - GENERAL
PAINLEVEL_OUTOF10: 0
PAINLEVEL_OUTOF10: 8

## 2022-10-29 ASSESSMENT — PAIN - FUNCTIONAL ASSESSMENT: PAIN_FUNCTIONAL_ASSESSMENT: 0-10

## 2022-10-29 ASSESSMENT — PAIN DESCRIPTION - DESCRIPTORS: DESCRIPTORS: PRESSURE

## 2022-10-29 NOTE — DISCHARGE INSTRUCTIONS
Continue current pain medications. Were prescribing antibiotics take all antibiotics as prescribed. Follow-up with urology: Arrange follow-up appointment. Return to emergency department if any new or emergent concerns.

## 2022-10-29 NOTE — TELEPHONE ENCOUNTER
Patient was seen on Friday virtually with concern of possible kidney stones. He was given flomax and pain medication. Patient was advised to call back if running a fever. He states he has a fever today and he is barely able to walk due to the pain. I advised patient to go on to the ER to be further evaluated. Patient verbalized his understanding.

## 2022-10-31 ENCOUNTER — CARE COORDINATION (OUTPATIENT)
Dept: CARE COORDINATION | Age: 32
End: 2022-10-31

## 2022-10-31 LAB
C. TRACHOMATIS DNA ,URINE: NEGATIVE
N. GONORRHOEAE DNA, URINE: NEGATIVE

## 2022-10-31 ASSESSMENT — ENCOUNTER SYMPTOMS
DIARRHEA: 0
BACK PAIN: 0
COUGH: 0
RHINORRHEA: 0
ABDOMINAL PAIN: 0
VOMITING: 0
WHEEZING: 0
NAUSEA: 0
PHOTOPHOBIA: 0
SHORTNESS OF BREATH: 0

## 2022-10-31 NOTE — ED PROVIDER NOTES
Emergency Department Provider Note  Location: [facility]  ED  10/29/2022     Patient Identification  Haylee Watters is a 32 y.o. male    Chief Complaint  Flank Pain (Pt to ED with c/o right sided flank pain that radiates to groin since last night. Pt reports pain with urination and tenderness to both testicles. + nausea)          HPI  (History provided by patient and family member patient and father)  Patient is a 75-year-old male who presents with right-sided flank pain and testicular pain, mainly right-sided that started yesterday. Initially mild pain became severe few hours ago. Reports yesterday noticed to mild pain in the right flank however today more in the testicle. Reports he thinks he has some left-sided testicular tenderness and pain as well but is mainly the right side. Reports some pain with urination as if it is \"hard to go\". Denies any penile discharge fevers chills nausea or vomiting. No history of STD. Reports he is sexually active. Denies any rectal pain or receptive intercourse. Denies any trauma. Denies any recent viral infections    Of note he was seen approximately a week ago and had right-sided upper flank/pleuritic pains and had CT renal protocol ordered for hematuria at that time did not show any evidence of kidney stones. 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 Past surgical history:  has no past surgical history on file. Home medications:   Prior to Admission medications    Medication Sig Start Date End Date Taking?  Authorizing Provider   levoFLOXacin (LEVAQUIN) 500 MG tablet Take 1 tablet by mouth daily for 10 days 10/29/22 11/8/22 Yes March MD Audie   tamsulosin (FLOMAX) 0.4 MG capsule Take 1 capsule by mouth daily 10/28/22   Zoila Huff MD   phenazopyridine (PYRIDIUM) 200 MG tablet Take 1 tablet by mouth 3 times daily as needed for Pain 10/28/22 10/31/22  Zoila Huff MD   traMADol (ULTRAM) 50 MG tablet Take 1 tablet by mouth every 4 hours as needed for Pain for up to 3 days. Intended supply: 3 days. Take lowest dose possible to manage pain 10/28/22 10/31/22  Zoila Huff MD   methocarbamol (ROBAXIN-750) 750 MG tablet Take 1 tablet by mouth 4 times daily for 10 days  Patient not taking: Reported on 10/28/2022 10/21/22 10/31/22  Zoila Huff MD   lamoTRIgine (LAMICTAL) 100 MG tablet Take 1 tablet by mouth nightly Start in 2 weeks after completion of 50 mg dose  Patient taking differently: Take 250 mg by mouth nightly 4/15/19   Janell Colmenares, APRN - CNP       Social history:  reports that he has never smoked. He has never used smokeless tobacco. He reports that he does not currently use alcohol. He reports that he does not use drugs. Family history:    Family History   Problem Relation Age of Onset    High Blood Pressure Father     Depression Father     Diabetes Maternal Grandfather     Stroke Maternal Grandfather     High Cholesterol Maternal Grandfather     Heart Disease Paternal Grandfather     Thyroid Disease Paternal Grandfather     Depression Paternal Grandfather     Thyroid Disease Maternal Aunt     Depression Paternal Aunt     Allergies Paternal Grandmother     Cancer Paternal Grandmother         Ovarian         ROS  Review of Systems   Constitutional:  Negative for chills and fever. HENT:  Negative for congestion and rhinorrhea. Eyes:  Negative for photophobia and visual disturbance. Respiratory:  Negative for cough, shortness of breath and wheezing. Cardiovascular:  Negative for chest pain and palpitations. Gastrointestinal:  Negative for abdominal pain, diarrhea, nausea and vomiting. Genitourinary:  Positive for flank pain and testicular pain. Negative for dysuria and hematuria. Musculoskeletal:  Negative for back pain and neck pain. Skin:  Negative for rash and wound. Neurological:  Negative for syncope and weakness. Psychiatric/Behavioral:  Negative for agitation and confusion. Exam  ED Triage Vitals [10/29/22 1713]   BP Temp Temp Source Heart Rate Resp SpO2 Height Weight   110/85 98.5 °F (36.9 °C) Oral (!) 103 16 97 % 6' 1\" (1.854 m) 174 lb (78.9 kg)       Physical Exam  Vitals and nursing note reviewed. Constitutional:       General: He is not in acute distress. Appearance: He is well-developed. HENT:      Head: Normocephalic and atraumatic. Nose: Nose normal. No congestion. Eyes:      General: No scleral icterus. Extraocular Movements: Extraocular movements intact. Cardiovascular:      Rate and Rhythm: Normal rate and regular rhythm. Heart sounds: No murmur heard. Pulmonary:      Effort: Pulmonary effort is normal.      Breath sounds: Normal breath sounds. Abdominal:      General: There is no distension. Palpations: Abdomen is soft. Tenderness: There is no abdominal tenderness. There is no right CVA tenderness or left CVA tenderness. Genitourinary:     Comments: Circumcised, normal-appearing genitalia. No scrotal erythema. There is significant right-sided testicular tenderness and appears to be unilaterally swollen. Cremaster reflex intact bilaterally. Normal testicular lie. No evidence of inguinal hernia. No other skin changes or ulcerations noted  Musculoskeletal:         General: No deformity. Normal range of motion. Cervical back: Normal range of motion and neck supple. Skin:     General: Skin is warm. Findings: No rash. Neurological:      Mental Status: He is alert and oriented to person, place, and time. Motor: No abnormal muscle tone.       Coordination: Coordination normal.   Psychiatric:         Mood and Affect: Mood normal.         Behavior: Behavior normal.         ED Course    ED Medication Orders (From admission, onward)      Start Ordered     Status Ordering Provider    10/29/22 1900 10/29/22 1855  cefTRIAXone (ROCEPHIN) injection 500 mg  ONCE        Question:  Antimicrobial Indications  Answer:  Urinary Tract Infection    Last MAR action: Given - by Krystal Mcnair on 10/29/22 at [facility], [facility]    10/29/22 1745 10/29/22 1733  ketorolac (TORADOL) injection 15 mg  ONCE         Last MAR action: Given - by Krystal Mcnair on 10/29/22 at [facility], [place]     10/29/22 1745 10/29/22 1733  0.9 % sodium chloride bolus  ONCE         Last MAR action: Stopped - by Krystal Mcnair on 10/29/22 at [address] [place] [address], [address] [place] [address] [address]              Radiology  No results found.       Labs  Results for orders placed or performed during the hospital encounter of 10/29/22   Urinalysis with Reflex to Culture    Specimen: Urine   Result Value Ref Range    Color, UA ORANGE (A) Straw/Yellow    Clarity, UA Clear Clear    Glucose, Ur Color Interfer (A) Negative mg/dL    Bilirubin Urine Color Interfer (A) Negative    Ketones, Urine Color Interfer (A) Negative mg/dL    Specific Gravity, UA Color Interfer 1.005 - 1.030    Blood, Urine Color Interfer (A) Negative    pH, UA Color Interfer (A) 5.0 - 8.0    Protein, UA Color Interfer (A) Negative mg/dL    Urobilinogen, Urine Color Interfer (A) <2.0 E.U./dL    Nitrite, Urine Color Interfer (A) Negative    Leukocyte Esterase, Urine Color Interfer (A) Negative    Microscopic Examination YES     Urine Type NotGiven     Urine Reflex to Culture Not Indicated    CBC with Auto Differential   Result Value Ref Range    WBC 11.7 (H) 4.0 - 11.0 K/uL    RBC 4.96 4.20 - 5.90 M/uL    Hemoglobin 14.6 13.5 - 17.5 g/dL    Hematocrit 42.8 40.5 - 52.5 %    MCV 86.2 80.0 - 100.0 fL    MCH 29.5 26.0 - 34.0 pg    MCHC 34.2 31.0 - 36.0 g/dL    RDW 13.3 12.4 - 15.4 %    Platelets 327 336 - 901 K/uL    MPV 6.8 5.0 - 10.5 fL    Neutrophils % 83.6 %    Lymphocytes % 10.1 %    Monocytes % 5.7 %    Eosinophils % 0.2 %    Basophils % 0.4 %    Neutrophils Absolute 9.8 (H) 1.7 - 7.7 K/uL    Lymphocytes Absolute 1.2 1.0 - 5.1 K/uL    Monocytes Absolute 0.7 0.0 - 1.3 K/uL    Eosinophils Absolute 0.0 0.0 - 0.6 K/uL    Basophils Absolute 0.0 0.0 - 0.2 K/uL   CMP w/ Reflex to MG   Result Value Ref Range    Sodium 133 (L) 136 - 145 mmol/L    Potassium reflex Magnesium 3.6 3.5 - 5.1 mmol/L    Chloride 98 (L) 99 - 110 mmol/L    CO2 26 21 - 32 mmol/L    Anion Gap 9 3 - 16    Glucose 164 (H) 70 - 99 mg/dL    BUN 13 7 - 20 mg/dL    Creatinine 1.3 0.9 - 1.3 mg/dL    Est, Glom Filt Rate >60 >60    Calcium 9.3 8.3 - 10.6 mg/dL    Total Protein 6.7 6.4 - 8.2 g/dL    Albumin 4.7 3.4 - 5.0 g/dL    Albumin/Globulin Ratio 2.4 (H) 1.1 - 2.2    Total Bilirubin 0.5 0.0 - 1.0 mg/dL    Alkaline Phosphatase 72 40 - 129 U/L    ALT 17 10 - 40 U/L    AST 20 15 - 37 U/L   Microscopic Urinalysis   Result Value Ref Range    WBC, UA 0-2 0 - 5 /HPF    RBC, UA 5-10 (A) 0 - 4 /HPF         Procedures  Procedures      MDM  Patient seen and evaluated. Relevant records reviewed. - Patient is 32 y.o. male presented for flank pain and testicular pain as noted above  - Exam showed as noted above  - Workup here consistent with epididymoorchitis of the right side. No evidence of testicular torsion. No evidence of ureterolithiasis. Given his age and risk factors he is been treated with one-time dose of Rocephin and I am placing him on Levaquin as well. Plan for follow-up with urology with return precautions. - I have a low concern for  other emergent process, and do not see indication for further work-up in the ER, as it is unlikely  and poses more risk than benefit. -  We agreed to d/c. Patient/family agreeable to plan and express understanding of plan. Clinical Impression:  1. Epididymo-orchitis          Disposition:  Discharge to home in good condition.     Blood pressure 105/66, pulse 81, temperature 98.5 °F (36.9 °C), temperature source Oral, resp. rate 16, height 6' 1\" (1.854 m), weight 174 lb (78.9 kg), SpO2 97 %. Patient was given scripts for the following medications. I counseled patient how to take these medications. Discharge Medication List as of 10/29/2022  7:12 PM        START taking these medications    Details   levoFLOXacin (LEVAQUIN) 500 MG tablet Take 1 tablet by mouth daily for 10 days, Disp-10 tablet, R-0Normal             Disposition referral (if applicable): [facility] [address]  [phone]    Schedule an appointment as soon as possible for a visit         INavneet, am the primary attending of record and contributed the majority of evaluation and treatment of emergent care for this encounter. Total critical care time is 10 minutes, which excludes separately billable procedures and updating family. Time spent is specifically for management of the presenting complaint and symptoms initially, direct bedside care, reevaluation, review of records, and consultation. There was a high probability of clinically significant life-threatening deterioration in the patient's condition, which required my urgent intervention. This chart was generated in part by using Dragon Dictation system and may contain errors related to that system including errors in grammar, punctuation, and spelling, as well as words and phrases that may be inappropriate. If there are any questions or concerns please feel free to contact the dictating provider for clarification.      Ronel Lee MD  [address] MD  10/31/22 3267

## 2022-10-31 NOTE — CARE COORDINATION
[facility] ED Follow up Call    Reason for ED visit:    Status:     improved    Did you call your PCP prior to going to the ED? No      Did you receive a discharge instructions from the Emergency Room? Yes  Review of Instructions:     Understands what to report/when to return?:  Yes   Understands discharge instructions?:  Yes   Following discharge instructions?:  Yes   If not why? N/A    Are there any new complaints of pain? Yes Flank Pain  New Pain Meds? No    Constipation prophylaxis needed? N/A    If you have a wound is the dressing clean, dry, and intact? N/A  Understands wound care regimen? N/A    Are there any other complaints/concerns that you wish to tell your provider? ACM completed follow up call with patient. Patient said he picked up antibiotic and is tolerating Levaquin well. Patient said he is able to walk around and is feeling overall better. Patient had no other questions at this time and declined further follow up. FU appts/Provider:    Future Appointments   Date Time Provider Joaquin Thomas   11/22/2022  8:20 AM ERNST Nelson CNP [facility]           New Medications?:   Yes      Medication Reconciliation by phone - Yes  Understands Medications? Yes  Taking Medications? Yes  Can you swallow your pills? Yes    Any further needs in the home i.e. Equipment?   No    Link to services in community?:  No   Which services:  N/A

## 2022-11-06 ASSESSMENT — ENCOUNTER SYMPTOMS: BACK PAIN: 1

## 2022-11-07 NOTE — PROGRESS NOTES
Marina Mack (:  1990) is a Established patient, here for evaluation of the following:    Assessment & Plan   Below is the assessment and plan developed based on review of pertinent history, physical exam, labs, studies, and medications. 1. Renal colic on right side  -     tamsulosin (FLOMAX) 0.4 MG capsule; Take 1 capsule by mouth daily, Disp-30 capsule, R-0Normal  -     phenazopyridine (PYRIDIUM) 200 MG tablet; Take 1 tablet by mouth 3 times daily as needed for Pain, Disp-9 tablet, R-0Normal  -     traMADol (ULTRAM) 50 MG tablet; Take 1 tablet by mouth every 4 hours as needed for Pain for up to 3 days. Intended supply: 3 days. Take lowest dose possible to manage pain, Disp-18 tablet, R-0Normal    No follow-ups on file. Caryn Antonio is a 32 y.o. male. Patient presents with:  Groin Pain: X 1 day, sensitive to touch  Back Pain      This is a 66-year-old male who has had groin pain with back pain that radiates into his groin for 1 to 2 days. He has had mild hematuria present. He does have a history of kidney stones. He feels this might be related to this. Patient has had no chills. No dysuria. He has been taking a warm bath and this has not been helpful. The patients PMH, surgical history, family history, medications, allergies were all reviewed and updated as appropriate today. Groin Pain    Back Pain    Review of Systems   Musculoskeletal:  Positive for back pain.         Objective   Patient-Reported Vitals  No data recorded     Physical Exam  [INSTRUCTIONS:  \"[x]\" Indicates a positive item  \"[]\" Indicates a negative item  -- DELETE ALL ITEMS NOT EXAMINED]    Constitutional: [x] Appears well-developed and well-nourished [x] No apparent distress      [] Abnormal -     Mental status: [x] Alert and awake  [x] Oriented to person/place/time [x] Able to follow commands    [] Abnormal -     Eyes:   EOM    [x]  Normal    [] Abnormal -   Sclera  [x]  Normal    [] Abnormal - Discharge [x]  None visible   [] Abnormal -     HENT: [x] Normocephalic, atraumatic  [] Abnormal -   [x] Mouth/Throat: Mucous membranes are moist    External Ears [x] Normal  [] Abnormal -    Neck: [x] No visualized mass [] Abnormal -     Pulmonary/Chest: [x] Respiratory effort normal   [x] No visualized signs of difficulty breathing or respiratory distress        [] Abnormal -      Musculoskeletal:   [x] Normal gait with no signs of ataxia         [x] Normal range of motion of neck        [] Abnormal -     Neurological:        [x] No Facial Asymmetry (Cranial nerve 7 motor function) (limited exam due to video visit)          [x] No gaze palsy        [] Abnormal -          Skin:        [x] No significant exanthematous lesions or discoloration noted on facial skin         [] Abnormal -            Psychiatric:       [x] Normal Affect [] Abnormal -        [x] No Hallucinations    Other pertinent observable physical exam findings:-             Shelly Antony, was evaluated through a synchronous (real-time) audio-video encounter. The patient (or guardian if applicable) is aware that this is a billable service, which includes applicable co-pays. This Virtual Visit was conducted with patient's (and/or legal guardian's) consent. The visit was conducted pursuant to the emergency declaration under the [address] [place] [address] and the [employer] General Act. Patient identification was verified, and a caregiver was present when appropriate. The patient was located at Home: [place] [address].    Provider was located at Home ([facility]): Radha Hung MD

## 2022-11-19 DIAGNOSIS — N23 RENAL COLIC ON RIGHT SIDE: ICD-10-CM

## 2022-11-21 ENCOUNTER — TELEPHONE (OUTPATIENT)
Dept: PULMONOLOGY | Age: 32
End: 2022-11-21

## 2022-11-21 RX ORDER — TAMSULOSIN HYDROCHLORIDE 0.4 MG/1
CAPSULE ORAL
Qty: 30 CAPSULE | Refills: 0 | Status: SHIPPED | OUTPATIENT
Start: 2022-11-21

## 2022-11-21 NOTE — TELEPHONE ENCOUNTER
Patient cancelled appointment on 11/22/22 with Kae Pastrana CNP for 31-90 day. Reason: pt cancelled via automated system    Patient did not reschedule appointment. Appointment rescheduled for na. Last OV        Assessment:       Snoring  Observed sleep apnea   Hypersomnia   Fatigue         Plan:      HST to evaluate forsleep related breathing disorder. Treatment options were discussed with patient if HST reveals CRISTIAN, including CPAP/APAP therapy, oral appliances and upper airway surgery. Trial auto CPAP 6-16 cm H2O if HST is positive for CRISTIAN  Sleep hygiene  Avoid sedatives, alcohol and caffeinated drinks at bed time. No driving motorized vehicles or operating heavy machinery while fatigue, drowsy or sleepy. Weight loss is also recommended as a long-term intervention. Complications of CRISTIAN if not treated were discussed with patient patient to include systemic hypertension, pulmonary hypertension, cardiovascular morbidities, car accidents and all cause mortality.   Discussed pathophysiology of CRISTIAN with patient today  Patient education handout provided regarding sleep tips

## 2022-11-21 NOTE — LETTER
[facility] Pulmonary, Critical Care, and Sleep  [address] [facility] [address] [address]  Phone: [phone]  Fax: [phone]    ERNST Ordoñez CNP        December 7, 2022    [address] [place] [address] [place] [address]      Dear Luh Hicks:    I am writing you because I have been informed of your cancelled appointments and not wanting to move forward with recommended treatment for your condition. We care about you and the management of your healthcare and want to make sure that you follow up as recommended. As your sleep provider I must stress to you how important it is for you to have the tests  and equipment I order as well to see me in follow up. The tests and equipment are necessary so that I can monitor your condition and manage your sleep issues. As discussed at prior visits, complications of untreated sleep apnea can include:  excessive daytime sleepiness, systemic hypertension, pulmonary hypertension cardiac arrhythmias such as atrial fibrillation, stroke, and increased all - cause mortality. We're sorry you were unhappy with your treatment plan and hope that you are doing well and we would like to continue treating your healthcare needs. Please call the office to let us know your plans for treatment and to schedule an appointment to discuss treatment options.       Sincerely,        ERNST Ordoñez CNP

## 2022-12-05 ENCOUNTER — TELEPHONE (OUTPATIENT)
Dept: FAMILY MEDICINE CLINIC | Age: 32
End: 2022-12-05

## 2022-12-05 DIAGNOSIS — N23 RENAL COLIC ON RIGHT SIDE: ICD-10-CM

## 2022-12-05 RX ORDER — TAMSULOSIN HYDROCHLORIDE 0.4 MG/1
CAPSULE ORAL
Qty: 30 CAPSULE | Refills: 5 | Status: SHIPPED | OUTPATIENT
Start: 2022-12-05

## 2022-12-05 NOTE — TELEPHONE ENCOUNTER
Pharmacy req refill for patient on  Tamsulosin 0.4mg caps  Last visit 10/28/22  No future  [facility]

## 2022-12-06 NOTE — TELEPHONE ENCOUNTER
Noted. Patient did not keep follow up appointment as was recommended. Please schedule a follow up visit for this patient when he calls requesting such appointment. Please send letter as patient has not followed up after testing.

## 2022-12-15 ENCOUNTER — TELEMEDICINE (OUTPATIENT)
Dept: FAMILY MEDICINE CLINIC | Age: 32
End: 2022-12-15

## 2022-12-15 DIAGNOSIS — J40 BRONCHITIS: Primary | ICD-10-CM

## 2022-12-15 PROCEDURE — 99213 OFFICE O/P EST LOW 20 MIN: CPT | Performed by: FAMILY MEDICINE

## 2022-12-15 RX ORDER — METHYLPREDNISOLONE 4 MG/1
TABLET ORAL
Qty: 1 KIT | Refills: 0 | Status: SHIPPED | OUTPATIENT
Start: 2022-12-15

## 2022-12-15 RX ORDER — AZITHROMYCIN 250 MG/1
250 TABLET, FILM COATED ORAL SEE ADMIN INSTRUCTIONS
Qty: 6 TABLET | Refills: 0 | Status: SHIPPED | OUTPATIENT
Start: 2022-12-15 | End: 2022-12-20

## 2022-12-15 ASSESSMENT — ENCOUNTER SYMPTOMS: COUGH: 1

## 2022-12-15 NOTE — PROGRESS NOTES
Shelly Antony (:  1990) is a Established patient, here for evaluation of the following:    Assessment & Plan   Below is the assessment and plan developed based on review of pertinent history, physical exam, labs, studies, and medications. 1. Bronchitis  -     azithromycin (ZITHROMAX) 250 MG tablet; Take 1 tablet by mouth See Admin Instructions for 5 days 500mg on day 1 followed by 250mg on days 2 - 5, Disp-6 tablet, R-0Normal  -     methylPREDNISolone (MEDROL, CINDY,) 4 MG tablet; Take by mouth as directed, Disp-1 kit, R-0Normal  No follow-ups on file. Thais Sanchez is a 28 y.o. male. Patient presents with:  Cough: X 3 weeks       Cough x 3 weeks. Worsening over time. Coughing up phlegm, some wheezing. No rhinorrhea, no sinus pressure. Intermittent cough. The patients PMH, surgical history, family history, medications, allergies were all reviewed and updated as appropriate today. Cough    Review of Systems   Respiratory:  Positive for cough.          Objective   Patient-Reported Vitals  No data recorded     Physical Exam  [INSTRUCTIONS:  \"[x]\" Indicates a positive item  \"[]\" Indicates a negative item  -- DELETE ALL ITEMS NOT EXAMINED]    Constitutional: [x] Appears well-developed and well-nourished [x] No apparent distress      [] Abnormal -     Mental status: [x] Alert and awake  [x] Oriented to person/place/time [x] Able to follow commands    [] Abnormal -     Eyes:   EOM    [x]  Normal    [] Abnormal -   Sclera  [x]  Normal    [] Abnormal -          Discharge [x]  None visible   [] Abnormal -     HENT: [x] Normocephalic, atraumatic  [] Abnormal -   [x] Mouth/Throat: Mucous membranes are moist    External Ears [x] Normal  [] Abnormal -    Neck: [x] No visualized mass [] Abnormal -     Pulmonary/Chest: [x] Respiratory effort normal   [x] No visualized signs of difficulty breathing or respiratory distress        [] Abnormal -      Musculoskeletal:   [x] Normal gait with no signs of ataxia         [x] Normal range of motion of neck        [] Abnormal -     Neurological:        [x] No Facial Asymmetry (Cranial nerve 7 motor function) (limited exam due to video visit)          [x] No gaze palsy        [] Abnormal -          Skin:        [x] No significant exanthematous lesions or discoloration noted on facial skin         [] Abnormal -            Psychiatric:       [x] Normal Affect [] Abnormal -        [x] No Hallucinations    Other pertinent observable physical exam findings:-         Beck Self, was evaluated through a synchronous (real-time) audio-video encounter. The patient (or guardian if applicable) is aware that this is a billable service, which includes applicable co-pays. This Virtual Visit was conducted with patient's (and/or legal guardian's) consent. The visit was conducted pursuant to the emergency declaration under the [facility], [address] [place] [address] [facility] authority and the [employer] General Act. Patient identification was verified, and a caregiver was present when appropriate. The patient was located at Home: [address] [place] [address].    Provider was located at [facility] ([address] [place] [address]): [facility] [place] [address], [address] Ray Anton MD

## 2022-12-21 NOTE — TELEPHONE ENCOUNTER
Chart/orders reviewed and signed
Orders faxed f/u 8/12/22.
PAP orders. Need faxed, with testing/OV note/demographics/insurance, once signed, to [facility].
Unit RN to OR RN

## 2023-01-12 ENCOUNTER — OFFICE VISIT (OUTPATIENT)
Dept: FAMILY MEDICINE CLINIC | Age: 33
End: 2023-01-12
Payer: COMMERCIAL

## 2023-01-12 VITALS
SYSTOLIC BLOOD PRESSURE: 110 MMHG | HEART RATE: 88 BPM | WEIGHT: 163 LBS | OXYGEN SATURATION: 98 % | DIASTOLIC BLOOD PRESSURE: 58 MMHG | HEIGHT: 72 IN | BODY MASS INDEX: 22.08 KG/M2

## 2023-01-12 DIAGNOSIS — G47.9 SLEEP DISORDER: Primary | ICD-10-CM

## 2023-01-12 PROCEDURE — 99213 OFFICE O/P EST LOW 20 MIN: CPT | Performed by: STUDENT IN AN ORGANIZED HEALTH CARE EDUCATION/TRAINING PROGRAM

## 2023-01-12 ASSESSMENT — PATIENT HEALTH QUESTIONNAIRE - PHQ9
10. IF YOU CHECKED OFF ANY PROBLEMS, HOW DIFFICULT HAVE THESE PROBLEMS MADE IT FOR YOU TO DO YOUR WORK, TAKE CARE OF THINGS AT HOME, OR GET ALONG WITH OTHER PEOPLE: 1
4. FEELING TIRED OR HAVING LITTLE ENERGY: 3
SUM OF ALL RESPONSES TO PHQ QUESTIONS 1-9: 13
7. TROUBLE CONCENTRATING ON THINGS, SUCH AS READING THE NEWSPAPER OR WATCHING TELEVISION: 1
SUM OF ALL RESPONSES TO PHQ QUESTIONS 1-9: 13
8. MOVING OR SPEAKING SO SLOWLY THAT OTHER PEOPLE COULD HAVE NOTICED. OR THE OPPOSITE, BEING SO FIGETY OR RESTLESS THAT YOU HAVE BEEN MOVING AROUND A LOT MORE THAN USUAL: 0
6. FEELING BAD ABOUT YOURSELF - OR THAT YOU ARE A FAILURE OR HAVE LET YOURSELF OR YOUR FAMILY DOWN: 3
5. POOR APPETITE OR OVEREATING: 0
3. TROUBLE FALLING OR STAYING ASLEEP: 2
1. LITTLE INTEREST OR PLEASURE IN DOING THINGS: 2
SUM OF ALL RESPONSES TO PHQ9 QUESTIONS 1 & 2: 4
2. FEELING DOWN, DEPRESSED OR HOPELESS: 2
9. THOUGHTS THAT YOU WOULD BE BETTER OFF DEAD, OR OF HURTING YOURSELF: 0

## 2023-01-12 NOTE — PROGRESS NOTES
Rhonda Stone (:  1990) is a 28 y.o. male,Established patient, here for evaluation of the following chief complaint(s):  Dizziness, Fatigue, and Chest Pain (Going on about a week and  half. Chest pain when he wakes in middle of night)       ASSESSMENT/PLAN:  1. Sleep disorder  -     Taryn Mariee MD, Sleep Medicine, [facility]  Overall, patient with daytime sleepiness that is excessive. May be related to mild sleep apnea versus narcolepsy versus other. It less now has the presentation of simple fatigue, suspect that it is related to a sleep disorder. Patient may follow-up as PCP as needed for the above concerns. No follow-ups on file. Subjective   SUBJECTIVE/OBJECTIVE:  HPI  Patient is a 55-year-old male, who presents to discuss dizziness, fatigue, shortness of breath, and previous chest pain. Patient was seen in March, experiencing symptoms of waking up in the middle of the night with chest pain and shortness of breath. Reports that others have been concerned that he is waking up in the melanite and gasping for air. No history of asthma. Patient wakes up after a full night of sleep and still feels sleepy. Has excessive daytime sleepiness, easily fall asleep whenever he is sitting down. Patient states that he no longer has the chest pain, but still has waking up in the middle the night gasping for air. Patient was previously evaluated by sleep medicine and found to have mild sleep apnea, was prescribed CPAP. Patient lost insurance had to get back to CPAP, now he would like a new referral to sleep medicine again. Patient also feels like the mild sleep apnea is not the answer to what he is experiencing and would like to further evaluation for daytime sleepiness or narcolepsy versus other    Review of Systems   All other systems reviewed and are negative.        Objective     Vitals:    23 1114   BP: (!) 110/58   Pulse: 88   SpO2: 98%   Weight: 163 lb (73.9 kg) Height: 6' (1.829 m)       Physical Exam  Vitals reviewed. Constitutional:       General: He is not in acute distress. Appearance: Normal appearance. He is not ill-appearing, toxic-appearing or diaphoretic. HENT:      Head: Normocephalic and atraumatic. Right Ear: External ear normal.      Left Ear: External ear normal.      Mouth/Throat:      Mouth: Mucous membranes are moist.   Eyes:      General: No scleral icterus. Conjunctiva/sclera: Conjunctivae normal.   Cardiovascular:      Rate and Rhythm: Normal rate. Comments: Patient appears well perfused  Pulmonary:      Effort: Pulmonary effort is normal.      Comments: Patient not dyspneic to conversation  Abdominal:      General: There is no distension. Musculoskeletal:      Cervical back: Normal range of motion. Skin:     General: Skin is warm and dry. Neurological:      Mental Status: He is alert. Mental status is at baseline. Psychiatric:         Behavior: Behavior normal.          An electronic signature was used to authenticate this note.     --Ivonne Bar MD

## 2023-01-24 PROBLEM — F32.A DEPRESSION: Status: ACTIVE | Noted: 2023-01-24

## 2023-01-24 PROBLEM — J45.909 ASTHMA DUE TO INTERNAL IMMUNOLOGICAL PROCESS: Status: ACTIVE | Noted: 2023-01-24

## 2023-01-24 PROBLEM — R10.13 DYSPEPSIA: Status: ACTIVE | Noted: 2023-01-24

## 2023-01-24 PROBLEM — F41.9 ANXIETY: Status: ACTIVE | Noted: 2023-01-24

## 2023-01-24 PROBLEM — J45.40 MODERATE PERSISTENT ASTHMA: Status: ACTIVE | Noted: 2023-01-24

## 2023-01-24 PROBLEM — R94.6 ABNORMAL FINDING ON THYROID FUNCTION TEST: Status: ACTIVE | Noted: 2023-01-24

## 2023-01-31 NOTE — TELEPHONE ENCOUNTER
A non sedating muscle relaxer sent. See if this will help.
Patient was seen last week for lumbar pain, he states that it is not any better and he is still in a lot of pain, taking ibuprofen with no relief. He cannot get in with neurology until 11/11.  Is there anything else he can do, please advise [phone]
Please advise
Pt informed
fair minus

## 2023-02-08 ENCOUNTER — TELEPHONE (OUTPATIENT)
Dept: PULMONOLOGY | Age: 33
End: 2023-02-08

## 2023-02-08 NOTE — TELEPHONE ENCOUNTER
Patient called stating he had appt with Krish Ro today that had to be cancelled because they need Rhiannon to OK the transfer. Patient said he is transferring because he lost his insurance and that is why he quit following up with [facility]. He has since regained insurance and his PCP referred him to [facility] so that's what he wants to go as he thinks his CRISTIAN is not narcolepsy.  Please advise/

## 2023-02-22 ENCOUNTER — OFFICE VISIT (OUTPATIENT)
Dept: PULMONOLOGY | Age: 33
End: 2023-02-22
Payer: COMMERCIAL

## 2023-02-22 VITALS
HEART RATE: 70 BPM | HEIGHT: 72 IN | RESPIRATION RATE: 16 BRPM | BODY MASS INDEX: 24.11 KG/M2 | WEIGHT: 178 LBS | SYSTOLIC BLOOD PRESSURE: 104 MMHG | DIASTOLIC BLOOD PRESSURE: 69 MMHG | TEMPERATURE: 98.2 F | OXYGEN SATURATION: 98 %

## 2023-02-22 DIAGNOSIS — G47.419 PRIMARY NARCOLEPSY WITHOUT CATAPLEXY: Primary | ICD-10-CM

## 2023-02-22 DIAGNOSIS — G47.10 HYPERSOMNOLENCE: ICD-10-CM

## 2023-02-22 PROCEDURE — 99204 OFFICE O/P NEW MOD 45 MIN: CPT | Performed by: INTERNAL MEDICINE

## 2023-02-22 RX ORDER — ARMODAFINIL 150 MG/1
150 TABLET ORAL DAILY
Qty: 30 TABLET | Refills: 3 | Status: SHIPPED | OUTPATIENT
Start: 2023-02-22 | End: 2023-03-24

## 2023-02-22 ASSESSMENT — ENCOUNTER SYMPTOMS
ALLERGIC/IMMUNOLOGIC NEGATIVE: 1
GASTROINTESTINAL NEGATIVE: 1
RESPIRATORY NEGATIVE: 1
EYES NEGATIVE: 1

## 2023-02-22 NOTE — PROGRESS NOTES
Louis Vega (: 1990 ) is a 28 y.o. male here for an evaluation of   Chief Complaint   Patient presents with    Sleep Apnea    Daytime Sleepiness         ASSESSMENT/PLAN:   Diagnosis Orders   1. Primary narcolepsy without cataplexy        2. Hypersomnolence                 I  RECOMMENDATIONS:     he will be scheduled for polysomnography in order to evaluate for the presence and severity of obstructive sleep apnea. He was given a discussion of the pathophysiology, evaluation and treatment of apnea. Thyroid function tests are recommended if not done recently. Advised to avoid driving when too sleepy to function safely and given a discussion of the risks of untreated apnea such as accidents, cognitive impairment, mood impairment, high blood pressure, various cardiac diseases and stroke. ESS is        Mallampati class 2        Will get Sleep study  I will call you with results    Will get PSG and MSLT      Will start on   Armodafinal 150 mg in the morning    Don't take until after the sleep study      RTc in 3 months    No follow-ups on file. SUBJECTIVE/OBJECTIVE:    Consult from Dr. Junior Hunt  Reason for consult is obstructive sleep apnea, hypersomnolence  Initially seen 2023      Subjective:              28old year old,male, with PMH significant for CRISTIAN,  that presents today for initial evaluation. Pt reports snoring, sleeping for years  and that it is getting worse. Pt reports does snore very mild for years. Patient's partner does complain of pt snoring. Pt's partner does not notice that he stops breathing during sleep. Pt's  does wake   himself with gasping, dry mouth. Pt does report fatigue or tiredness frequently. Pt sleeps more than 7 hours, and at times overwhelming sleepiness attacks. Pt  does dozes unintentionally while watching TV or movies. While driving  does not feel drowsy / nod off / fall sleep at stop lights.  Pt does not have h/o sleepiness associated wrecks/near wrecks. Pt does nod off while  unattended. Going to bed at 1030 pm and waking up 7 am      Pt does not report having restless legs 0 times a week. This is often accompanied by leg jerks during sleep, numbness in legs or feet, aches/burning/cramps in legs, feet. Does not report having nasal congestion. Negative  for use of nasal sprays, nose or sinus surgery. negative for broken nose, tonsillectomy, sleeping w/ chest raised. Does not sleep with oxygen. Pt does not have a dental appliance or braces on teeth. Does not teeth grinding. Does  report nightmares, sleep talking, dreaming during naps. When angry or laughing Rhonda Stone does not report cataplexy. he does report hallucinations when dozing off or immediately upon awakening. Does  report sleep paralysis. Does not have parasomnia. Patient's [place] Sleepiness score  is required. Patient  is CONSISTENT with moderate daytime sleepiness. Review of Systems   Constitutional: Negative. HENT: Negative. Eyes: Negative. Respiratory: Negative. Cardiovascular: Negative. Gastrointestinal: Negative. Endocrine: Negative. Genitourinary: Negative. Musculoskeletal: Negative. Skin: Negative. Allergic/Immunologic: Negative. Neurological: Negative. Hematological: Negative. Psychiatric/Behavioral: Negative. Vitals:    02/22/23 1423   BP: 104/69   Site: Left Upper Arm   Position: Sitting   Cuff Size: Medium Adult   Pulse: 70   Resp: 16   Temp: 98.2 °F (36.8 °C)   TempSrc: Temporal   SpO2: 98%   Weight: 178 lb (80.7 kg)   Height: 6' (1.829 m)        Physical Exam  Vitals and nursing note reviewed. Constitutional:       General: He is not in acute distress. Appearance: Normal appearance. He is not ill-appearing. HENT:      Head: Normocephalic and atraumatic.       Right Ear: External ear normal.      Left Ear: External ear normal.      Nose: Nose normal.      Mouth/Throat: Mouth: Mucous membranes are moist.      Pharynx: Oropharynx is clear. Comments: Mallampati 2  Eyes:      General: No scleral icterus. Extraocular Movements: Extraocular movements intact. Conjunctiva/sclera: Conjunctivae normal.      Pupils: Pupils are equal, round, and reactive to light. Cardiovascular:      Rate and Rhythm: Normal rate and regular rhythm. Pulses: Normal pulses. Heart sounds: Normal heart sounds. No murmur heard. No friction rub. Pulmonary:      Effort: No respiratory distress. Breath sounds: No wheezing. Abdominal:      General: Abdomen is flat. Bowel sounds are normal. There is no distension. Tenderness: There is no abdominal tenderness. There is no guarding. Musculoskeletal:         General: No swelling or tenderness. Normal range of motion. Cervical back: Normal range of motion and neck supple. No rigidity. Skin:     General: Skin is warm and dry. Coloration: Skin is not jaundiced. Neurological:      General: No focal deficit present. Mental Status: He is alert and oriented to person, place, and time. Mental status is at baseline. Cranial Nerves: No cranial nerve deficit. Sensory: No sensory deficit. Motor: No weakness. Gait: Gait normal.   Psychiatric:         Mood and Affect: Mood normal.         Thought Content:  Thought content normal.         Judgment: Judgment normal.            Murtaza Carney MD

## 2023-02-22 NOTE — PATIENT INSTRUCTIONS
ASSESSMENT/PLAN:   Diagnosis Orders   1. Primary narcolepsy without cataplexy        2. Hypersomnolence                 I  RECOMMENDATIONS:     he will be scheduled for polysomnography in order to evaluate for the presence and severity of obstructive sleep apnea. He was given a discussion of the pathophysiology, evaluation and treatment of apnea. Thyroid function tests are recommended if not done recently. Advised to avoid driving when too sleepy to function safely and given a discussion of the risks of untreated apnea such as accidents, cognitive impairment, mood impairment, high blood pressure, various cardiac diseases and stroke.        ESS is 11/24       Mallampati class 2        Will get Sleep study  I will call you with results    Will get PSG and MSLT      Will start on   Armodafinal 150 mg in the morning    Don't take until after the sleep study      RTc in 3 months

## 2023-02-22 NOTE — LETTER
[facility]  [address] [place] [id number]  Phone: [phone]  Fax: [phone]    Heidi Mercedes MD        February 22, 2023     Patient: Haylee Watters   YOB: 1990   Date of Visit: 2/22/2023 2/22/23        Haylee Watters      I have seen this patient in the office today and wanted to communicate my findings and recommendations. Patient Instructions         ASSESSMENT/PLAN:   Diagnosis Orders   1. Primary narcolepsy without cataplexy        2. Hypersomnolence                 I  RECOMMENDATIONS:     he will be scheduled for polysomnography in order to evaluate for the presence and severity of obstructive sleep apnea. He was given a discussion of the pathophysiology, evaluation and treatment of apnea. Thyroid function tests are recommended if not done recently. Advised to avoid driving when too sleepy to function safely and given a discussion of the risks of untreated apnea such as accidents, cognitive impairment, mood impairment, high blood pressure, various cardiac diseases and stroke.        ESS is 11/24       Mallampati class 2        Will get Sleep study  I will call you with results    Will get PSG and MSLT      Will start on   Armodafinal 150 mg in the morning    Don't take until after the sleep study      RTc in 3 months                   Thank you for allowing me to assist in the care of the MD Heidi Marques MD

## 2023-02-22 NOTE — PROGRESS NOTES
MA Communication:   The following orders are received by verbal communication from Shana Fulton MD    Orders include:  PSG/MSLT (sleep ctr to call pt)       3 mo fu scheduled 5/24/23

## 2023-02-27 ENCOUNTER — TELEPHONE (OUTPATIENT)
Dept: PULMONOLOGY | Age: 33
End: 2023-02-27

## 2023-02-27 ENCOUNTER — HOSPITAL ENCOUNTER (EMERGENCY)
Age: 33
Discharge: HOME | End: 2023-02-27
Payer: COMMERCIAL

## 2023-02-27 VITALS
HEIGHT: 73 IN | DIASTOLIC BLOOD PRESSURE: 86 MMHG | OXYGEN SATURATION: 93 % | BODY MASS INDEX: 24.19 KG/M2 | WEIGHT: 182.54 LBS | RESPIRATION RATE: 18 BRPM | TEMPERATURE: 98 F | SYSTOLIC BLOOD PRESSURE: 127 MMHG | HEART RATE: 85 BPM

## 2023-02-27 LAB
EKG ATRIAL RATE: 93 BPM
EKG DIAGNOSIS: NORMAL
EKG P AXIS: 86 DEGREES
EKG P-R INTERVAL: 136 MS
EKG Q-T INTERVAL: 358 MS
EKG QRS DURATION: 98 MS
EKG QTC CALCULATION (BAZETT): 445 MS
EKG R AXIS: 88 DEGREES
EKG T AXIS: 51 DEGREES
EKG VENTRICULAR RATE: 93 BPM
GLUCOSE BLD-MCNC: 92 MG/DL (ref 70–99)
PERFORMED ON: NORMAL

## 2023-02-27 PROCEDURE — 93005 ELECTROCARDIOGRAM TRACING: CPT | Performed by: EMERGENCY MEDICINE

## 2023-02-27 PROCEDURE — 4500000002 HC ER NO CHARGE

## 2023-03-01 ENCOUNTER — OFFICE VISIT (OUTPATIENT)
Dept: FAMILY MEDICINE CLINIC | Age: 33
End: 2023-03-01

## 2023-03-01 VITALS
DIASTOLIC BLOOD PRESSURE: 60 MMHG | HEART RATE: 100 BPM | HEIGHT: 73 IN | SYSTOLIC BLOOD PRESSURE: 122 MMHG | WEIGHT: 168 LBS | BODY MASS INDEX: 22.26 KG/M2 | OXYGEN SATURATION: 98 % | TEMPERATURE: 98.5 F

## 2023-03-01 DIAGNOSIS — R50.9 FEVER, UNSPECIFIED FEVER CAUSE: ICD-10-CM

## 2023-03-01 DIAGNOSIS — R30.0 DYSURIA: Primary | ICD-10-CM

## 2023-03-01 DIAGNOSIS — Z11.4 SCREENING FOR HIV WITHOUT PRESENCE OF RISK FACTORS: ICD-10-CM

## 2023-03-01 DIAGNOSIS — R11.2 NAUSEA AND VOMITING, UNSPECIFIED VOMITING TYPE: ICD-10-CM

## 2023-03-01 LAB
A/G RATIO: 1.6 (ref 1.1–2.2)
ALBUMIN SERPL-MCNC: 4.9 G/DL (ref 3.4–5)
ALP BLD-CCNC: 78 U/L (ref 40–129)
ALT SERPL-CCNC: 12 U/L (ref 10–40)
ANION GAP SERPL CALCULATED.3IONS-SCNC: 16 MMOL/L (ref 3–16)
AST SERPL-CCNC: 20 U/L (ref 15–37)
BILIRUB SERPL-MCNC: 0.8 MG/DL (ref 0–1)
BILIRUBIN, POC: ABNORMAL
BLOOD URINE, POC: ABNORMAL
BUN BLDV-MCNC: 18 MG/DL (ref 7–20)
CALCIUM SERPL-MCNC: 9.7 MG/DL (ref 8.3–10.6)
CHLORIDE BLD-SCNC: 97 MMOL/L (ref 99–110)
CLARITY, POC: CLEAR
CO2: 22 MMOL/L (ref 21–32)
COLOR, POC: YELLOW
CREAT SERPL-MCNC: 1.4 MG/DL (ref 0.9–1.3)
GFR SERPL CREATININE-BSD FRML MDRD: >60 ML/MIN/{1.73_M2}
GLUCOSE BLD-MCNC: 103 MG/DL (ref 70–99)
GLUCOSE URINE, POC: ABNORMAL
INFLUENZA A ANTIGEN, POC: NEGATIVE
INFLUENZA B ANTIGEN, POC: NEGATIVE
KETONES, POC: 15
LEUKOCYTE EST, POC: ABNORMAL
LOT EXPIRE DATE: NORMAL
LOT KIT NUMBER: NORMAL
NITRITE, POC: ABNORMAL
PH, POC: 6
POTASSIUM SERPL-SCNC: 4.7 MMOL/L (ref 3.5–5.1)
PROTEIN, POC: 100
SARS-COV-2, POC: NORMAL
SODIUM BLD-SCNC: 135 MMOL/L (ref 136–145)
SPECIFIC GRAVITY, POC: >=1.03
TOTAL PROTEIN: 7.9 G/DL (ref 6.4–8.2)
UROBILINOGEN, POC: 0.2
VALID INTERNAL CONTROL: NORMAL
VENDOR AND KIT NAME POC: NORMAL

## 2023-03-01 SDOH — ECONOMIC STABILITY: TRANSPORTATION INSECURITY
IN THE PAST 12 MONTHS, HAS LACK OF TRANSPORTATION KEPT YOU FROM MEETINGS, WORK, OR FROM GETTING THINGS NEEDED FOR DAILY LIVING?: NO

## 2023-03-01 SDOH — ECONOMIC STABILITY: HOUSING INSECURITY
IN THE LAST 12 MONTHS, WAS THERE A TIME WHEN YOU DID NOT HAVE A STEADY PLACE TO SLEEP OR SLEPT IN A SHELTER (INCLUDING NOW)?: NO

## 2023-03-01 SDOH — ECONOMIC STABILITY: FOOD INSECURITY: WITHIN THE PAST 12 MONTHS, THE FOOD YOU BOUGHT JUST DIDN'T LAST AND YOU DIDN'T HAVE MONEY TO GET MORE.: OFTEN TRUE

## 2023-03-01 SDOH — ECONOMIC STABILITY: INCOME INSECURITY: HOW HARD IS IT FOR YOU TO PAY FOR THE VERY BASICS LIKE FOOD, HOUSING, MEDICAL CARE, AND HEATING?: HARD

## 2023-03-01 SDOH — ECONOMIC STABILITY: FOOD INSECURITY: WITHIN THE PAST 12 MONTHS, YOU WORRIED THAT YOUR FOOD WOULD RUN OUT BEFORE YOU GOT MONEY TO BUY MORE.: OFTEN TRUE

## 2023-03-01 NOTE — PROGRESS NOTES
[facility]  3/1/2023    An Villa (:  1990) is a 28 y.o. male, here for evaluation of the following medical concerns:    Chief Complaint   Patient presents with    Abdominal Pain    Nausea & Vomiting        ASSESSMENT/ PLAN  1. Dysuria  UA consistent with dehydration with negative nitrites and leukocytes that could be suggestive of an infection. However given patient is mildly oliguric and has had to drink a couple water to have a small amount of urine sample, can likely also be diluted. We will send out for STI screening and work-up for UTI. Suspect patient's current systemic symptoms likely secondary to acute gastroenteritis. No recent travel, no recent antibiotics. Social history non contributatory. Discussed ED red flags particularly since patient is already mild-moderately dehydrated particularly needing IV hydration  - POCT Urinalysis no Micro  - Sexual Health Organism ID by PCR; Future  - Urinary Tract Infection Organism and Resistance ID by PCR; Future  - CBC with Auto Differential; Future  - Comprehensive Metabolic Panel; Future  - Comprehensive Metabolic Panel  - CBC with Auto Differential  - Urinary Tract Infection Organism and Resistance ID by PCR  - Sexual Health Organism ID by PCR    2. Fever, unspecified fever cause  Likely secondary to acute GI  - POCT COVID-19 & Influenza A/B    3. Nausea and vomiting, unspecified vomiting type  No peritoneal signs. No abdominal tenderness other than patient felt that it can induce nausea while I was palpating his abdomen throughout. COVID and flu test negative. UA not really consistent with urinary tract infection. Discussed keeping up with hydration and if not doing well especially if he is anuric, discussed going to emergency room particularly if patient develops other systemic symptoms  - POCT COVID-19 & Influenza A/B    4. Screening for HIV without presence of risk factors  - HIV Screen;  Future  - HIV Screen Patient is presenting with high fevers, nausea vomiting and decreased p.o. Upon looking for possible etiologies of patient's GI complaints and high fevers, patient also reporting dysuria with a monogamous female partner. Differential diagnoses include urinary tract infection, sexually transmitted infection, acute gastroenteritis, although patient does not have respiratory concerns, cannot rule out COVID/flu at this time. No follow-ups on file. Total Time Spent with Patient: 45 minutes, of which greater than 50% was spent on counseling or coordinating care. Education provided regarding visit today. See visit diagnoses, orders and follow up  recommendations. Portions of record reviewed for pertinent issues: active problem list,medication list,allergies,social history, health maintenance. Discussed probable diagnosis, test results if available in office today and management options with patient: patient agreed to a medical plan. Will contact patient for results. If symptoms worsen or fail to improve please contact PCP as soon as possible or go directly to the emergency department. HPI  Fevers, nausea/vomiting  -On chart review, patient went to ED on 2/27/2023 but it looks like patient eloped before evaluation or any treatment can be completed. POC glucose normal, EKG reviewed by me showing NSR.  Had to go back to work  - started as \"dizziness\" and weakness of the legs, then transitioned to nausea/vomiting, diarrhea unable to keep anything down  - x3 days with associated fevers Tmax 103, this am 101 and responds to tylenol, nausea/vomiting total of 5 times since onset, denies hematochezia, ab pain, headaches, sore throat, congestion, coughing, rhinorrhea, chest pain, SOB, hematuria, hematochezia, penile discharge/rash, travel  - reports dysuria, diarrhea, decreased urine output  - sexually active last intercourse 1 week ago, monogamous female, denies history of STI,     - no tobacco, occ EtoH, drugs  - denies sick contacts, ab surgeries  - last taken acetaminophen today at 2am  Works as  at [employer]  Current Outpatient Medications on File Prior to Visit   Medication Sig Dispense Refill    Armodafinil (NUVIGIL) 150 MG TABS tablet Take 1 tablet by mouth daily for 30 days. Max Daily Amount: 150 mg 30 tablet 3    lamoTRIgine (LAMICTAL) 100 MG tablet Take 1 tablet by mouth nightly Start in 2 weeks after completion of 50 mg dose (Patient taking differently: Take 250 mg by mouth nightly) 30 tablet 0     No current facility-administered medications on file prior to visit. 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/24/2023    Bipolar I disorder, most recent episode (or current) manic, moderate (HCC)     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    Depression 1/24/2023    Panic attack      Patient Active Problem List   Diagnosis    Nausea vomiting and diarrhea    Rash    Intermittent lightheadedness    Boil    Sinus tachycardia    Hypokalemia    Chest pain    Dizziness    Carotid body syndrome    Bipolar I disorder, most recent episode (or current) manic, moderate (HCC)    Acute left ankle pain    Patellar tendinitis of left knee    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 Weight as of this encounter: 168 lb (76.2 kg). Physical Exam  Vitals and nursing note reviewed. Constitutional:       General: He is not in acute distress. Appearance: Normal appearance.  He is normal weight. He is not ill-appearing. HENT:      Head: Normocephalic and atraumatic. Right Ear: External ear normal.      Left Ear: External ear normal.      Mouth/Throat:      Mouth: Mucous membranes are moist.      Pharynx: Oropharynx is clear. No oropharyngeal exudate or posterior oropharyngeal erythema. Cardiovascular:      Rate and Rhythm: Normal rate and regular rhythm. Pulses: Normal pulses. Heart sounds: Normal heart sounds. Pulmonary:      Effort: Pulmonary effort is normal.      Breath sounds: Normal breath sounds. Abdominal:      General: Bowel sounds are normal. There is no distension. Palpations: Abdomen is soft. There is no mass. Tenderness: There is no abdominal tenderness. There is no right CVA tenderness, left CVA tenderness or guarding. Musculoskeletal:      Cervical back: Normal range of motion and neck supple. No tenderness. Lymphadenopathy:      Cervical: No cervical adenopathy. Skin:     General: Skin is warm and dry. Neurological:      General: No focal deficit present. Mental Status: He is alert and oriented to person, place, and time. Mental status is at baseline. Psychiatric:         Mood and Affect: Mood normal.         Behavior: Behavior normal.         Thought Content: Thought content normal.         Judgment: Judgment normal.     Tessa Ferguson DO    This dictation was generated by voice recognition computer software. Although all attempts are made to edit the dictation for accuracy, there may be errors in the transcription that are not intended.

## 2023-03-02 LAB
ACINETOBACTER BAUMANNII BY PCR: NOT DETECTED
BACTEROIDES FRAGILIS BY PCR: NOT DETECTED
BASOPHILS ABSOLUTE: 0 K/UL (ref 0–0.2)
BASOPHILS RELATIVE PERCENT: 0.5 %
CANDIDA PARAPSILOSIS: NOT DETECTED
CANDIDA SPP: NOT DETECTED
CARBAPENEM RESISTANCE OXA-48 GENE BY PCR: NOT DETECTED
CARBAPENEM RESISTANCE OXA-48 GENE BY PCR: NOT DETECTED
CEPHALOSPORIN RESISTANCE AMPC GENE: NOT DETECTED
CEPHALOSPORIN RESISTANCE AMPC GENE: NOT DETECTED
CHLAMYDIA TRACHOMATIS BY RT-PCR: NOT DETECTED
CITROBACTER FREUNDII: NOT DETECTED
ENTEROBACTER CLOACAE: NOT DETECTED
ENTEROCOCCUS SPP. (E. FAECALIS, E. FAECIUM): NOT DETECTED
EOSINOPHILS ABSOLUTE: 0 K/UL (ref 0–0.6)
EOSINOPHILS RELATIVE PERCENT: 0.8 %
ESBL RESISTANCE: NOT DETECTED
ESBL RESISTANCE: NOT DETECTED
ESCHERICHIA COLI BY PCR: NOT DETECTED
GARDNERELLA VAGINALIS: ABNORMAL
HCT VFR BLD CALC: 50 % (ref 40.5–52.5)
HEMOGLOBIN: 17.2 G/DL (ref 13.5–17.5)
HIV AG/AB: NORMAL
HIV ANTIGEN: NORMAL
HIV-1 ANTIBODY: NORMAL
HIV-2 AB: NORMAL
KLEBSIELLA OXYTOCA BY PCR: NOT DETECTED
KLEBSIELLA PNEUMONIAE: NOT DETECTED
LACTOBACILLUS SPP.: NOT DETECTED
LYMPHOCYTES ABSOLUTE: 0.7 K/UL (ref 1–5.1)
LYMPHOCYTES RELATIVE PERCENT: 11.5 %
M HOMINIS SPEC QL CULT: NOT DETECTED
MACROLIDE RESISTANCE: NOT DETECTED
MACROLIDE RESISTANCE: NOT DETECTED
MCH RBC QN AUTO: 29.5 PG (ref 26–34)
MCHC RBC AUTO-ENTMCNC: 34.5 G/DL (ref 31–36)
MCV RBC AUTO: 85.5 FL (ref 80–100)
METHICILLIN RESISTANCE: NOT DETECTED
METHICILLIN RESISTANCE: NOT DETECTED
MONOCYTES ABSOLUTE: 0.4 K/UL (ref 0–1.3)
MONOCYTES RELATIVE PERCENT: 6.6 %
MORGANELLA MORGANII: NOT DETECTED
MYCOPLASMA GENITALIUM PCR: NOT DETECTED
NEISSERIA GONORRHOEAE BY RT-PCR: NOT DETECTED
NEUTROPHILS ABSOLUTE: 4.8 K/UL (ref 1.7–7.7)
NEUTROPHILS RELATIVE PERCENT: 80.6 %
PDW BLD-RTO: 14.1 % (ref 12.4–15.4)
PLATELET # BLD: 329 K/UL (ref 135–450)
PMV BLD AUTO: 7.6 FL (ref 5–10.5)
PROTEUS SPP (PROTEUS MIRABILIS, PROTEUS VULGARIS): NOT DETECTED
PROVIDENCIA STUARTII: NOT DETECTED
PSEUDOMONAS AERUGINOSA BY PCR: NOT DETECTED
QUINOLONE AND FLUOROQUINOLONE RESISTANCE: NOT DETECTED
QUINOLONE AND FLUOROQUINOLONE RESISTANCE: NOT DETECTED
RBC # BLD: 5.85 M/UL (ref 4.2–5.9)
SERRATIA MARCESCENS BY PCR: NOT DETECTED
STAPHYLOCOCCUS AUREUS BY PCR: NOT DETECTED
STAPHYLOCOCCUS SAPROPHYTICUS: NOT DETECTED
STREPTOCOCCUS AGALACTIAE BY PCR: NOT DETECTED
STREPTOCOCCUS PYOGENES  BY PCR: NOT DETECTED
TETRACYCLINE RESISTANCE: NOT DETECTED
TETRACYCLINE RESISTANCE: NOT DETECTED
TRICHOMONAS VAGINALIS: NOT DETECTED
TRIMETHOPRIM/SULFONAMIDE RESISTANCE: NOT DETECTED
TRIMETHOPRIM/SULFONAMIDE RESISTANCE: NOT DETECTED
UREAPLASMA UREALYTICUM BY PCR: DETECTED
WBC # BLD: 6 K/UL (ref 4–11)

## 2023-03-03 DIAGNOSIS — N34.1 URETHRITIS, NONGONOCOCCAL: Primary | ICD-10-CM

## 2023-03-03 RX ORDER — DOXYCYCLINE HYCLATE 100 MG
100 TABLET ORAL 2 TIMES DAILY
Qty: 14 TABLET | Refills: 0 | Status: SHIPPED | OUTPATIENT
Start: 2023-03-03 | End: 2023-03-10

## 2023-03-06 NOTE — TELEPHONE ENCOUNTER
[facility] notified us that PA cannot be done through them since pt has YOOSE. I submitted info through [employer] at [email or web address] [employer]    EOC [id number]    Will check later to see if medication was approved.

## 2023-04-21 ENCOUNTER — OFFICE VISIT (OUTPATIENT)
Dept: FAMILY MEDICINE CLINIC | Age: 33
End: 2023-04-21
Payer: COMMERCIAL

## 2023-04-21 VITALS
WEIGHT: 178 LBS | HEART RATE: 56 BPM | HEIGHT: 73 IN | SYSTOLIC BLOOD PRESSURE: 124 MMHG | DIASTOLIC BLOOD PRESSURE: 82 MMHG | OXYGEN SATURATION: 100 % | RESPIRATION RATE: 16 BRPM | BODY MASS INDEX: 23.59 KG/M2

## 2023-04-21 DIAGNOSIS — H10.531 CONTACT BLEPHAROCONJUNCTIVITIS OF RIGHT EYE: Primary | ICD-10-CM

## 2023-04-21 PROCEDURE — 99213 OFFICE O/P EST LOW 20 MIN: CPT | Performed by: REGISTERED NURSE

## 2023-04-21 RX ORDER — ERYTHROMYCIN 5 MG/G
OINTMENT OPHTHALMIC
Qty: 3.5 G | Refills: 0 | Status: SHIPPED | OUTPATIENT
Start: 2023-04-21

## 2023-04-21 ASSESSMENT — ENCOUNTER SYMPTOMS
SORE THROAT: 0
RESPIRATORY NEGATIVE: 1
SWOLLEN GLANDS: 0
PHOTOPHOBIA: 0
EYE PAIN: 0
DOUBLE VISION: 0
GASTROINTESTINAL NEGATIVE: 1
EYE DISCHARGE: 1
EYE REDNESS: 1
RHINORRHEA: 0
EYE ITCHING: 1

## 2023-05-24 ENCOUNTER — OFFICE VISIT (OUTPATIENT)
Dept: FAMILY MEDICINE CLINIC | Age: 33
End: 2023-05-24
Payer: COMMERCIAL

## 2023-05-24 VITALS
HEIGHT: 73 IN | BODY MASS INDEX: 23.19 KG/M2 | DIASTOLIC BLOOD PRESSURE: 76 MMHG | RESPIRATION RATE: 16 BRPM | OXYGEN SATURATION: 98 % | HEART RATE: 64 BPM | SYSTOLIC BLOOD PRESSURE: 92 MMHG | WEIGHT: 175 LBS

## 2023-05-24 DIAGNOSIS — G47.419 PRIMARY NARCOLEPSY WITHOUT CATAPLEXY: ICD-10-CM

## 2023-05-24 DIAGNOSIS — L72.0 EPIDERMAL CYST: ICD-10-CM

## 2023-05-24 DIAGNOSIS — F31.12 BIPOLAR I DISORDER, MOST RECENT EPISODE (OR CURRENT) MANIC, MODERATE (HCC): ICD-10-CM

## 2023-05-24 DIAGNOSIS — L25.8 CONTACT DERMATITIS DUE TO OTHER AGENT, UNSPECIFIED CONTACT DERMATITIS TYPE: Primary | ICD-10-CM

## 2023-05-24 PROCEDURE — 99214 OFFICE O/P EST MOD 30 MIN: CPT | Performed by: STUDENT IN AN ORGANIZED HEALTH CARE EDUCATION/TRAINING PROGRAM

## 2023-05-24 RX ORDER — LAMOTRIGINE 100 MG/1
250 TABLET ORAL NIGHTLY
Qty: 75 TABLET | Refills: 3
Start: 2023-05-24 | End: 2023-06-23

## 2023-05-24 RX ORDER — CETIRIZINE HYDROCHLORIDE 10 MG/1
10 TABLET ORAL DAILY
Qty: 30 TABLET | Refills: 0 | Status: SHIPPED | OUTPATIENT
Start: 2023-05-24 | End: 2023-06-23

## 2023-05-24 RX ORDER — TRIAMCINOLONE ACETONIDE 1 MG/G
CREAM TOPICAL
Qty: 45 G | Refills: 0 | Status: SHIPPED | OUTPATIENT
Start: 2023-05-24

## 2023-05-24 NOTE — PROGRESS NOTES
[facility]  2023    Sudhir Armstrong (:  1990) is a 28 y.o. male, here for evaluation of the following medical concerns:    Chief Complaint   Patient presents with    Rash     On throat, spreading to chest, noticed after eating lunch at work today        ASSESSMENT/ PLAN  1. Contact dermatitis due to other agent, unspecified contact dermatitis type  Macule differential viral exanthem, contact dermatitis, drug-induced (lamotrigine). Discussed management of each with patient. Trial po antihistamine and kenalog bid  - cetirizine (ZYRTEC) 10 MG tablet; Take 1 tablet by mouth daily  Dispense: 30 tablet; Refill: 0  - triamcinolone (KENALOG) 0.1 % cream; Apply topically 2 times daily. Dispense: 45 g; Refill: 0    2. Epidermal cyst  I&D done in office today with manual expression, area clean and dried and sterile bandage applied. No signs of infection. Discussed warm compresses. If it recurs in the future, recommend in-office I&D etc    3. Bipolar I disorder, most recent episode (or current) manic, moderate ([facility] [address]  Pt established with psych and has been on this since . Stable. - lamoTRIgine (LAMICTAL) 100 MG tablet; Take 2.5 tablets by mouth at bedtime  Dispense: 75 tablet; Refill: 3    4. Primary narcolepsy without cataplexy  Pt follows with Dr. Armani Alexandre. Not on pharmacotherapy. Discussed issue with cost. Encouraged pt to follow up with sleep and maybe they can consider switching for armodafinil to modafinil (whichever one is cheaper) or can use goodRx coupon. No follow-ups on file. Education provided regarding visit today. See visit diagnoses, orders and follow up  recommendations. Portions of record reviewed for pertinent issues: active problem list,medication list,allergies,social history, health maintenance. Discussed probable diagnosis, test results if available in office today and management options with patient: patient agreed to a medical plan.   Will contact patient

## 2023-10-17 ENCOUNTER — TELEPHONE (OUTPATIENT)
Dept: FAMILY MEDICINE CLINIC | Age: 33
End: 2023-10-17

## 2023-10-17 NOTE — TELEPHONE ENCOUNTER
Pt transferred to nurse triage line. Pt experiencing worsening chest pain over last few days. Pt states he has had intermittent chest pain for about 10 yrs. Pt is currently in [place] will return home Friday. I scheduled pt an appointment for Friday, but did advise him he should be evaluated in [place] either at an urgent care or ER.

## 2023-10-20 ENCOUNTER — OFFICE VISIT (OUTPATIENT)
Dept: FAMILY MEDICINE CLINIC | Age: 33
End: 2023-10-20
Payer: COMMERCIAL

## 2023-10-20 VITALS
OXYGEN SATURATION: 98 % | HEART RATE: 64 BPM | BODY MASS INDEX: 24.33 KG/M2 | HEIGHT: 73 IN | WEIGHT: 183.6 LBS | SYSTOLIC BLOOD PRESSURE: 122 MMHG | DIASTOLIC BLOOD PRESSURE: 68 MMHG

## 2023-10-20 DIAGNOSIS — R07.9 INTERMITTENT CHEST PAIN: Primary | ICD-10-CM

## 2023-10-20 PROCEDURE — 93000 ELECTROCARDIOGRAM COMPLETE: CPT | Performed by: NURSE PRACTITIONER

## 2023-10-20 PROCEDURE — 99214 OFFICE O/P EST MOD 30 MIN: CPT | Performed by: NURSE PRACTITIONER

## 2023-10-20 ASSESSMENT — ENCOUNTER SYMPTOMS
GASTROINTESTINAL NEGATIVE: 1
COUGH: 0
SHORTNESS OF BREATH: 0
WHEEZING: 0

## 2023-10-20 NOTE — PROGRESS NOTES
Patient: Debbie Grissom is a 28 y.o. male who presents today with the following Chief Complaint(s):  Chief Complaint   Patient presents with    Sleep Problem    Chest Pain     Worsening chest pain x 3 weeks       Assessment:  Encounter Diagnosis   Name Primary? Intermittent chest pain Yes       Plan:  1. Intermittent chest pain  EKG normal sinus. Will check a stress echo and referral to cardiology. Possibly related to anxiety but patient denies feeling anxious and will need to rule cardiac cause with stress test.   - EKG 12 Lead - Clinic Performed  - Echocardiogram stress test; Future  - Lisha Caal, , Cardiology, [place]  - Comprehensive Metabolic Panel; Future  - Magnesium; Future      HPI  Patient presents today with concerns of intermittent chest pains. He states it has been more consistent for the past 3 weeks. He notices it when he first wakes up in the morning. Sometimes after exercise but not during. He runs almost everyday. He has not taken any medications. He reports the pain is in the center of his chest. She states it feels tight and if he takes some deep breaths it will improve. Current Outpatient Medications   Medication Sig Dispense Refill    lamoTRIgine (LAMICTAL) 100 MG tablet Take 2.5 tablets by mouth at bedtime 75 tablet 3    triamcinolone (KENALOG) 0.1 % cream Apply topically 2 times daily. 45 g 0     No current facility-administered medications for this visit. Patient's past medical history, surgical history, family history, medications,and allergies  were all reviewed and updated as appropriate today. Review of Systems   Constitutional: Negative. HENT: Negative. Respiratory:  Negative for cough, shortness of breath and wheezing. Cardiovascular:  Negative for chest pain and palpitations. Gastrointestinal: Negative. Neurological: Negative. Negative for dizziness and headaches. Psychiatric/Behavioral: Negative.            Physical

## 2023-10-31 ENCOUNTER — HOSPITAL ENCOUNTER (OUTPATIENT)
Dept: NON INVASIVE DIAGNOSTICS | Age: 33
Discharge: HOME | End: 2023-10-31
Payer: COMMERCIAL

## 2023-10-31 DIAGNOSIS — R07.9 INTERMITTENT CHEST PAIN: ICD-10-CM

## 2023-10-31 PROCEDURE — 93350 STRESS TTE ONLY: CPT

## 2023-11-03 ENCOUNTER — TELEPHONE (OUTPATIENT)
Dept: FAMILY MEDICINE CLINIC | Age: 33
End: 2023-11-03

## 2023-11-03 NOTE — TELEPHONE ENCOUNTER
He had a stress echo done, it was normal. It was resulted and says it was viewed by him in [facility].

## 2023-11-27 NOTE — PROGRESS NOTES
fluid, and   avoiding alcohol and caffeine were discussed. Continue Florinef. The patient will be followed   up by Dr. Kristin Hull in [address] echocardiogram 10/31/2023   Conclusions   Summary   Negative stress echo for ischemia   Negative stress EKG stiven ischemia   Excellent exercise tolerance. Baseline resting echocardiogram shows normal global LV systolic function   with an ejection fraction of 60% and uniform myocardial segmental wall   motion. Following stress there was uniform augmentation of all myocardial   segments with appropriate hyperdynamic LV systolic response to stress. No symptoms during the procedure. 13.4 METS     Latest Reference Range & Units 10/20/23 14:14   Sodium 136 - 145 mmol/L 138   Potassium 3.5 - 5.1 mmol/L 4.1   Chloride 99 - 110 mmol/L 101   CO2 21 - 32 mmol/L 27   BUN,BUNPL 7 - 20 mg/dL 14   Creatinine 0.9 - 1.3 mg/dL 1.2   Anion Gap 3 - 16  10   Est, Glom Filt Rate >60  >60   Magnesium 1.80 - 2.40 mg/dL 2.10   Glucose, Random 70 - 99 mg/dL 96   CALCIUM, SERUM, 411032 8.3 - 10.6 mg/dL 9.5   Total Protein 6.4 - 8.2 g/dL 7.1      Latest Reference Range & Units 10/20/23 14:14   Albumin 3.4 - 5.0 g/dL 5.1 (H)   Albumin/Globulin Ratio 1.1 - 2.2  2.6 (H)   Alk Phos 40 - 129 U/L 73   ALT 10 - 40 U/L 21   AST 15 - 37 U/L 22   BILIRUBIN TOTAL 0.0 - 1.0 mg/dL 0.4   Total Protein 6.4 - 8.2 g/dL 7.1   Glucose, Random 70 - 99 mg/dL 96      Latest Reference Range & Units 04/22/21 09:18   Cholesterol, Fasting 0 - 199 mg/dL 148   HDL Cholesterol 40 - 60 mg/dL 45   LDL Calculated <100 mg/dL 88   Triglyceride, Fasting 0 - 150 mg/dL 73   VLDL Cholesterol Calculated Not Established mg/dL 15         Assessment:   Chest pain - etiology uncertain. Normal stress echo indicates significant heart disease unlikely   Light headed - chronic dating back at least 2015. Overall decreased an stable   History of syncope - positive TILT table test at [facility] 2015.  No recurrence past few years   Family history of

## 2023-11-28 ENCOUNTER — OFFICE VISIT (OUTPATIENT)
Dept: CARDIOLOGY CLINIC | Age: 33
End: 2023-11-28
Payer: COMMERCIAL

## 2023-11-28 VITALS
BODY MASS INDEX: 24.12 KG/M2 | WEIGHT: 182 LBS | HEART RATE: 82 BPM | OXYGEN SATURATION: 99 % | HEIGHT: 73 IN | DIASTOLIC BLOOD PRESSURE: 62 MMHG | SYSTOLIC BLOOD PRESSURE: 108 MMHG

## 2023-11-28 DIAGNOSIS — R07.9 CHEST PAIN, UNSPECIFIED TYPE: Primary | ICD-10-CM

## 2023-11-28 PROCEDURE — 99244 OFF/OP CNSLTJ NEW/EST MOD 40: CPT | Performed by: INTERNAL MEDICINE

## 2023-11-28 NOTE — PATIENT INSTRUCTIONS
Plan:  ~Recommend a CT calcium score which is a test used as a screening tool for coronary artery disease. If the score is above 0, then would recommend Aspirin and Statin therapy. This test is considered a screening tool so it is not covered by insurance. ~[employer] [phone] ([phone])  Cardiac medications reviewed including indications and pertinent side effects. Medication list updated at this visit.    Check blood pressure and heart rate at home a few times per week- keep a log with dates and times and bring to office visit   Regular exercise and following a healthy diet encouraged   Follow up with me as needed

## 2024-01-03 ENCOUNTER — TELEPHONE (OUTPATIENT)
Dept: CARDIOLOGY CLINIC | Age: 34
End: 2024-01-03

## 2024-01-03 NOTE — TELEPHONE ENCOUNTER
Calcium score 0.  No strong indication for aspirin or statin at this point.  Prior stress test was negative.  Reassuring workup to date.  Follow-up with PCP for further evaluation for noncardiac causes of chest pain.  Follow-up with cardiology as needed.

## 2024-04-19 ENCOUNTER — OFFICE VISIT (OUTPATIENT)
Dept: PULMONOLOGY | Age: 34
End: 2024-04-19
Payer: COMMERCIAL

## 2024-04-19 VITALS
BODY MASS INDEX: 23.86 KG/M2 | TEMPERATURE: 97.6 F | WEIGHT: 180 LBS | OXYGEN SATURATION: 99 % | DIASTOLIC BLOOD PRESSURE: 63 MMHG | HEIGHT: 73 IN | SYSTOLIC BLOOD PRESSURE: 96 MMHG | HEART RATE: 86 BPM | RESPIRATION RATE: 12 BRPM

## 2024-04-19 DIAGNOSIS — G47.33 OSA (OBSTRUCTIVE SLEEP APNEA): Primary | ICD-10-CM

## 2024-04-19 DIAGNOSIS — G47.10 HYPERSOMNOLENCE: ICD-10-CM

## 2024-04-19 DIAGNOSIS — G47.419 NARCOLEPSY WITHOUT CATAPLEXY: ICD-10-CM

## 2024-04-19 PROCEDURE — 99213 OFFICE O/P EST LOW 20 MIN: CPT | Performed by: NURSE PRACTITIONER

## 2024-04-19 ASSESSMENT — SLEEP AND FATIGUE QUESTIONNAIRES
HOW LIKELY ARE YOU TO NOD OFF OR FALL ASLEEP WHEN YOU ARE A PASSENGER IN A CAR FOR AN HOUR WITHOUT A BREAK: WOULD NEVER DOZE
HOW LIKELY ARE YOU TO NOD OFF OR FALL ASLEEP WHILE LYING DOWN TO REST IN THE AFTERNOON WHEN CIRCUMSTANCES PERMIT: HIGH CHANCE OF DOZING
HOW LIKELY ARE YOU TO NOD OFF OR FALL ASLEEP IN A CAR, WHILE STOPPED FOR A FEW MINUTES IN TRAFFIC: WOULD NEVER DOZE
HOW LIKELY ARE YOU TO NOD OFF OR FALL ASLEEP WHILE SITTING INACTIVE IN A PUBLIC PLACE: MODERATE CHANCE OF DOZING
HOW LIKELY ARE YOU TO NOD OFF OR FALL ASLEEP WHILE SITTING QUIETLY AFTER LUNCH WITHOUT ALCOHOL: HIGH CHANCE OF DOZING
HOW LIKELY ARE YOU TO NOD OFF OR FALL ASLEEP WHILE SITTING AND READING: HIGH CHANCE OF DOZING
ESS TOTAL SCORE: 15
HOW LIKELY ARE YOU TO NOD OFF OR FALL ASLEEP WHILE WATCHING TV: HIGH CHANCE OF DOZING
HOW LIKELY ARE YOU TO NOD OFF OR FALL ASLEEP WHILE SITTING AND TALKING TO SOMEONE: SLIGHT CHANCE OF DOZING
NECK CIRCUMFERENCE (INCHES): 15

## 2024-04-19 ASSESSMENT — ENCOUNTER SYMPTOMS
RHINORRHEA: 0
EYE PAIN: 0
SORE THROAT: 0
COUGH: 0
ABDOMINAL PAIN: 0
SHORTNESS OF BREATH: 0
CHEST TIGHTNESS: 0
WHEEZING: 0

## 2024-04-19 NOTE — PATIENT INSTRUCTIONS
Remember to bring a list of pulmonary medications and any CPAP or BiPAP machines to your next appointment with the office.     Please keep all of your future appointments scheduled by [facility] [place] [facility] office. Out of respect for other patients and providers, you may be asked to reschedule your appointment if you arrive later than your scheduled appointment time. Appointments cancelled less than 24hrs in advance will be considered a no show. Patients with three missed appointments within 1 year or four missed appointments within 2 years can be dismissed from the practice.     Please be aware that our physicians are required to work in the Intensive Care Unit at [facility].  Your appointment may need to be rescheduled if they are designated to work during your appointment time.      You may receive a survey regarding the care you received during your visit.  Your input is valuable to us.  We encourage you to complete and return your survey.  We hope you will choose us in the future for your healthcare needs.     Pt instructed of all future appointment dates & times, including radiology, labs, procedures & referrals. If procedures were scheduled preparation instructions provided. Instructions on future appointments with [facility] were given. Sleep Lab [phone]     Paresh Vargas will be scheduled for polysomnography in order to evaluate for the presence and severity of obstructive sleep apnea. He was given a discussion of the pathophysiology, evaluation and treatment of apnea. Thyroid function tests are recommended if not done recently.     Advised to avoid driving when too sleepy to function safely. Discussed the risks of untreated apnea such as accidents, cognitive impairment, mood impairment, high blood pressure, various cardiac diseases and stroke.      Call office for follow up after testing completed.

## 2024-04-19 NOTE — PROGRESS NOTES
Subjective: sleep evaluation     Paresh Vargas is a 33 y.o. year old,male, with PMH significant for  known CRISTIAN, bipolar, anxiety,  Follow-up (sleep)  ,  that presents today for re-evaluation.  Patient was referred by self . Pt reports poor sleep quality  for many years  and that it is getting worse. He had a Home sleep study completed 5/17/22 which showed mild sleep apnea, AHI 7.4, weight at time of study was 179 lbs.  CPAP was ordered at that time however he lost insurance and had to return machine.  He was then seen 3/2023 by Dr. Milton for concerns of possible narcolepsy without cataplexy and his hypersomnolence.  MSLT and PSG ordered at that time however he again had lost his insurance. He now has new insurance and would like to be re-evaluated for his sleep issues. States even with PAP device was still having hypersomnolence. States he falls asleep playing video games with friends. States anytime lights are dimmed, he starts to fall asleep.     Pt reports does snore  for many years. Patient's partner does complain of pt snoring. Pt's partner does notice that he stops breathing during sleep.  Pt  does wake themselves with gasping, snoring.      Pt does report fatigue or tiredness frequently. Pt sleeps more than 5-7 hours, and  overwhelming sleepiness attacks.  Pt  does doze unintentionally while watching TV or reading. While driving  does not feel drowsy / nod off / fall sleep at stop lights. Pt does not have h/o sleepiness associated wrecks/near wrecks. Pt does nod off while  sitting quietly.     Pt does not report having restless legs 0 times a week.  He does not report having nasal congestion.  Negative  for use of nasal sprays, nose or sinus surgery.  Negative for broken nose, tonsillectomy, sleeping w/ chest raised. He does not sleep with oxygen. Pt does not have a dental appliance or braces on teeth. Denies teeth grinding. He  does not report nightmares, sleep walking, dreaming during naps. Talks in his

## 2024-05-20 ENCOUNTER — HOSPITAL ENCOUNTER (OUTPATIENT)
Dept: SLEEP CENTER | Age: 34
Discharge: HOME | End: 2024-05-22
Payer: COMMERCIAL

## 2024-05-20 DIAGNOSIS — G47.10 HYPERSOMNOLENCE: ICD-10-CM

## 2024-05-20 DIAGNOSIS — G47.419 NARCOLEPSY WITHOUT CATAPLEXY: ICD-10-CM

## 2024-05-20 DIAGNOSIS — G47.33 OSA (OBSTRUCTIVE SLEEP APNEA): ICD-10-CM

## 2024-05-20 PROCEDURE — 95810 POLYSOM 6/> YRS 4/> PARAM: CPT

## 2024-05-21 PROBLEM — G47.10 HYPERSOMNOLENCE: Status: ACTIVE | Noted: 2024-05-21

## 2024-05-21 PROBLEM — G47.419 NARCOLEPSY WITHOUT CATAPLEXY: Status: ACTIVE | Noted: 2024-05-21

## 2024-05-21 PROBLEM — G47.33 OSA (OBSTRUCTIVE SLEEP APNEA): Status: ACTIVE | Noted: 2024-05-21

## 2024-05-21 PROCEDURE — 95810 POLYSOM 6/> YRS 4/> PARAM: CPT | Performed by: INTERNAL MEDICINE

## 2024-05-22 ENCOUNTER — TELEPHONE (OUTPATIENT)
Dept: PULMONOLOGY | Age: 34
End: 2024-05-22

## 2024-05-22 NOTE — TELEPHONE ENCOUNTER
Please call to schedule as a \" new to provider\" appointment with with Dr. Ernst to discuss sleep symptoms.

## 2024-06-04 ASSESSMENT — SLEEP AND FATIGUE QUESTIONNAIRES
HOW LIKELY ARE YOU TO NOD OFF OR FALL ASLEEP WHILE LYING DOWN TO REST IN THE AFTERNOON WHEN CIRCUMSTANCES PERMIT: MODERATE CHANCE OF DOZING
HOW LIKELY ARE YOU TO NOD OFF OR FALL ASLEEP WHILE SITTING AND TALKING TO SOMEONE: SLIGHT CHANCE OF DOZING
HOW LIKELY ARE YOU TO NOD OFF OR FALL ASLEEP WHILE WATCHING TV: HIGH CHANCE OF DOZING
ESS TOTAL SCORE: 11
HOW LIKELY ARE YOU TO NOD OFF OR FALL ASLEEP WHILE SITTING AND READING: HIGH CHANCE OF DOZING
HOW LIKELY ARE YOU TO NOD OFF OR FALL ASLEEP WHILE SITTING QUIETLY AFTER LUNCH WITHOUT ALCOHOL: SLIGHT CHANCE OF DOZING
HOW LIKELY ARE YOU TO NOD OFF OR FALL ASLEEP IN A CAR, WHILE STOPPED FOR A FEW MINUTES IN TRAFFIC: WOULD NEVER DOZE
HOW LIKELY ARE YOU TO NOD OFF OR FALL ASLEEP WHEN YOU ARE A PASSENGER IN A CAR FOR AN HOUR WITHOUT A BREAK: WOULD NEVER DOZE
HOW LIKELY ARE YOU TO NOD OFF OR FALL ASLEEP WHILE SITTING INACTIVE IN A PUBLIC PLACE: SLIGHT CHANCE OF DOZING

## 2024-06-05 ENCOUNTER — OFFICE VISIT (OUTPATIENT)
Dept: PULMONOLOGY | Age: 34
End: 2024-06-05
Payer: COMMERCIAL

## 2024-06-05 VITALS
HEART RATE: 61 BPM | HEIGHT: 73 IN | TEMPERATURE: 97.8 F | RESPIRATION RATE: 16 BRPM | BODY MASS INDEX: 24.39 KG/M2 | DIASTOLIC BLOOD PRESSURE: 65 MMHG | SYSTOLIC BLOOD PRESSURE: 109 MMHG | OXYGEN SATURATION: 96 % | WEIGHT: 184 LBS

## 2024-06-05 DIAGNOSIS — G47.33 OSA (OBSTRUCTIVE SLEEP APNEA): Primary | ICD-10-CM

## 2024-06-05 PROCEDURE — 99213 OFFICE O/P EST LOW 20 MIN: CPT | Performed by: INTERNAL MEDICINE

## 2024-06-05 ASSESSMENT — SLEEP AND FATIGUE QUESTIONNAIRES
ESS TOTAL SCORE: 11
HOW LIKELY ARE YOU TO NOD OFF OR FALL ASLEEP WHEN YOU ARE A PASSENGER IN A CAR FOR AN HOUR WITHOUT A BREAK: WOULD NEVER DOZE
HOW LIKELY ARE YOU TO NOD OFF OR FALL ASLEEP WHEN YOU ARE A PASSENGER IN A CAR FOR AN HOUR WITHOUT A BREAK: WOULD NEVER DOZE
HOW LIKELY ARE YOU TO NOD OFF OR FALL ASLEEP WHILE LYING DOWN TO REST IN THE AFTERNOON WHEN CIRCUMSTANCES PERMIT: MODERATE CHANCE OF DOZING
HOW LIKELY ARE YOU TO NOD OFF OR FALL ASLEEP WHILE SITTING AND READING: HIGH CHANCE OF DOZING
HOW LIKELY ARE YOU TO NOD OFF OR FALL ASLEEP WHILE SITTING AND TALKING TO SOMEONE: SLIGHT CHANCE OF DOZING
HOW LIKELY ARE YOU TO NOD OFF OR FALL ASLEEP WHILE SITTING QUIETLY AFTER LUNCH WITHOUT ALCOHOL: SLIGHT CHANCE OF DOZING
HOW LIKELY ARE YOU TO NOD OFF OR FALL ASLEEP WHILE SITTING INACTIVE IN A PUBLIC PLACE: SLIGHT CHANCE OF DOZING
HOW LIKELY ARE YOU TO NOD OFF OR FALL ASLEEP WHILE LYING DOWN TO REST IN THE AFTERNOON WHEN CIRCUMSTANCES PERMIT: MODERATE CHANCE OF DOZING
HOW LIKELY ARE YOU TO NOD OFF OR FALL ASLEEP WHILE SITTING AND TALKING TO SOMEONE: SLIGHT CHANCE OF DOZING
HOW LIKELY ARE YOU TO NOD OFF OR FALL ASLEEP IN A CAR, WHILE STOPPED FOR A FEW MINUTES IN TRAFFIC: WOULD NEVER DOZE
HOW LIKELY ARE YOU TO NOD OFF OR FALL ASLEEP WHILE SITTING AND READING: HIGH CHANCE OF DOZING
HOW LIKELY ARE YOU TO NOD OFF OR FALL ASLEEP IN A CAR, WHILE STOPPED FOR A FEW MINUTES IN TRAFFIC: WOULD NEVER DOZE
HOW LIKELY ARE YOU TO NOD OFF OR FALL ASLEEP WHILE SITTING QUIETLY AFTER LUNCH WITHOUT ALCOHOL: SLIGHT CHANCE OF DOZING
NECK CIRCUMFERENCE (INCHES): 14
HOW LIKELY ARE YOU TO NOD OFF OR FALL ASLEEP WHILE SITTING INACTIVE IN A PUBLIC PLACE: SLIGHT CHANCE OF DOZING
HOW LIKELY ARE YOU TO NOD OFF OR FALL ASLEEP WHILE WATCHING TV: HIGH CHANCE OF DOZING
HOW LIKELY ARE YOU TO NOD OFF OR FALL ASLEEP WHILE WATCHING TV: HIGH CHANCE OF DOZING

## 2024-06-05 NOTE — PATIENT INSTRUCTIONS
Remember to bring a list of pulmonary medications and any CPAP or BiPAP machines to your next appointment with the office.     Please keep all of your future appointments scheduled by [facility] [place] [facility] office. Out of respect for other patients and providers, you may be asked to reschedule your appointment if you arrive later than your scheduled appointment time. Appointments cancelled less than 24hrs in advance will be considered a no show. Patients with three missed appointments within 1 year or four missed appointments within 2 years can be dismissed from the practice.     Please be aware that our physicians are required to work in the Intensive Care Unit at [facility].  Your appointment may need to be rescheduled if they are designated to work during your appointment time.      You may receive a survey regarding the care you received during your visit.  Your input is valuable to us.  We encourage you to complete and return your survey.  We hope you will choose us in the future for your healthcare needs.     Pt instructed of all future appointment dates & times, including radiology, labs, procedures & referrals. If procedures were scheduled preparation instructions provided. Instructions on future appointments with [facility] were given.      In the next few weeks, you will be receiving a survey from [facility] regarding your visit today.  We would greatly appreciate it if you would take just a few minutes to fill that out.  It is very important to us that our patients receive top notch care and our surveys help keep us accountable. However, if your experience was not a good one, we want to hear about that as well. This is a key way we can keep track of problems and strive to correct any for future visits.    Again, we appreciate your time and thank you for choosing [facility]!    ULICES Collier

## 2024-06-05 NOTE — PROGRESS NOTES
MA Communication:  The following orders are received by verbal communication from Feng Ernst MD    Orders include:  Home sleep Study follow up after    
affect.    DATA:  HST 5/17/2022 AHI 7.4, lost insurance and return machine    PSG 5/21/2024 only 37 minutes of sleep, no events, he said this is a very atypical night for him    ASSESSMENT:  Obstructive Sleep Apnea, mild in past, symptoms are severe  Daytime sleepiness    PLAN:   HST and f/u after.  If HST is negative, will need to go back to sleep lab with sleep aid.

## 2024-06-27 ENCOUNTER — TELEPHONE (OUTPATIENT)
Dept: PULMONOLOGY | Age: 34
End: 2024-06-27

## 2024-06-27 NOTE — TELEPHONE ENCOUNTER
Paresh is scheduled next week for home sleep study follow up. I do not see where this has been completed yet. Please call patient about follow up with the sleep centr to complete home study.

## 2024-06-27 NOTE — TELEPHONE ENCOUNTER
Shirley from sleep center said they left a message for him but he never called back.    LM asking patient to return call to office.

## 2024-06-28 NOTE — TELEPHONE ENCOUNTER
Pt. Phone numbers are incorrect. Tried to reach parent without success. Also left message for girlfriend to get number and sent my chart message.

## 2024-07-08 ENCOUNTER — HOSPITAL ENCOUNTER (OUTPATIENT)
Dept: SLEEP CENTER | Age: 34
Discharge: HOME | End: 2024-07-10
Payer: COMMERCIAL

## 2024-07-08 DIAGNOSIS — G47.33 OSA (OBSTRUCTIVE SLEEP APNEA): ICD-10-CM

## 2024-07-08 PROCEDURE — 95806 SLEEP STUDY UNATT&RESP EFFT: CPT

## 2024-07-18 ENCOUNTER — OFFICE VISIT (OUTPATIENT)
Dept: PULMONOLOGY | Age: 34
End: 2024-07-18
Payer: COMMERCIAL

## 2024-07-18 VITALS
RESPIRATION RATE: 16 BRPM | OXYGEN SATURATION: 99 % | SYSTOLIC BLOOD PRESSURE: 112 MMHG | TEMPERATURE: 97.7 F | BODY MASS INDEX: 23.19 KG/M2 | HEIGHT: 73 IN | HEART RATE: 74 BPM | WEIGHT: 175 LBS | DIASTOLIC BLOOD PRESSURE: 74 MMHG

## 2024-07-18 DIAGNOSIS — G47.33 OSA (OBSTRUCTIVE SLEEP APNEA): Primary | ICD-10-CM

## 2024-07-18 DIAGNOSIS — G47.10 HYPERSOMNOLENCE: ICD-10-CM

## 2024-07-18 PROCEDURE — 99213 OFFICE O/P EST LOW 20 MIN: CPT | Performed by: NURSE PRACTITIONER

## 2024-07-18 RX ORDER — ZOLPIDEM TARTRATE 5 MG/1
5 TABLET ORAL NIGHTLY PRN
Qty: 2 TABLET | Refills: 0 | Status: SHIPPED | OUTPATIENT
Start: 2024-07-18 | End: 2024-07-20

## 2024-07-18 ASSESSMENT — ENCOUNTER SYMPTOMS
ABDOMINAL PAIN: 0
CHEST TIGHTNESS: 0
RHINORRHEA: 0
EYE PAIN: 0
WHEEZING: 0
SHORTNESS OF BREATH: 0
SORE THROAT: 0
COUGH: 0

## 2024-07-18 NOTE — PROGRESS NOTES
SLEEP MEDICINE FOLLOW UP NOTE  CC: Obstructive Sleep Apnea     Interval history  -  Paresh is here to follow up after HST completed on 7/8/24. This showed AHI 3.6 however, however this study almost certainly underestimates  the severity of his sleep apnea with his symptoms.     Presenting history - 6/5/2024 -seen on 4/19/24 for sleep evaluation.  Had PSG/MSLT ordered but only at 37 minutes of sleep time.  He said this was inexplicable to him.  He never has trouble sleeping.  He has a routine bedtime of 930 to 10 PM.  He falls asleep within minutes.  He wakes at 7 AM with an alarm.  He does not feel refreshed and restored.  He rarely naps during the day.  He has a remote history of sleep paralysis, last episode was more than 2 years ago.  He tosses and turns in his sleep.  He has witnessed sleep apneas.  He has a remote sleep study that showed mild sleep apnea.         6/5/2024    12:34 PM 6/4/2024     9:48 AM 4/19/2024     8:55 AM 4/19/2022     2:22 PM 11/6/2018     9:31 AM   Sleep Medicine   Sitting and reading 3 3 3 3 3   Watching TV 3 3 3 3 3   Sitting, inactive in a public place (e.g. a theatre or a meeting) 1 1 2 1 1   As a passenger in a car for an hour without a break 0 0 0 2 1   Lying down to rest in the afternoon when circumstances permit 2 2 3 3 3   Sitting and talking to someone 1 1 1 1 0   Sitting quietly after a lunch without alcohol 1 1 3 1 2   In a car, while stopped for a few minutes in traffic 0 0 0 0 0   [place] Sleepiness Score 11 11 15 14 13   Neck (Inches) 14  15  15            PHYSICAL EXAM:  There were no vitals taken for this visit.'   Constitutional:  No acute distress.   HENT:  Oropharynx is clear and moist. Mallampati class ***  Eyes: Pupils equal, round, and reactive to light. No scleral icterus.   Neck: No tracheal deviation present.  Circumference is *** inches   Cardiovascular: Normal S1S2.  No lower extremity edema.  Pulmonary/Chest: Clear breath sounds.  No accessory muscle usage.

## 2024-07-18 NOTE — PATIENT INSTRUCTIONS
Repeat PSG with Ambien 5 mg to take night of sleep study. Can repeat dose in 30-45 minutes if not asleep.         Advised to avoid driving when too sleepy to function safely. Discussed the risks of untreated apnea such as accidents, cognitive impairment, mood impairment, high blood pressure, various cardiac diseases and stroke.       After sleep study follow up  In the next few weeks, you will be receiving a survey from [facility] regarding your visit today.  We would greatly appreciate it if you would take just a few minutes to fill that out.  It is very important to us that our patients receive top notch care and our surveys help keep us accountable. However, if your experience was not a good one, we want to hear about that as well. This is a key way we can keep track of problems and strive to correct any for future visits.    Again, we appreciate your time and thank you for choosing [facility]!    ULICES Alfonso

## 2024-07-18 NOTE — PROGRESS NOTES
MA Communication:  The following orders are received by verbal communication from Jo Winston CNP    Orders include:  call after in-lab

## 2024-07-18 NOTE — PROGRESS NOTES
SLEEP MEDICINE FOLLOW UP NOTE  CC: Obstructive Sleep Apnea     Interval history  -   Paresh is here to follow up after HST completed on 7/8/24. This showed AHI 3.6 however,  this study almost certainly underestimates  the severity of his sleep apnea with his symptoms. States he is still tired. Not getting restful sleep. ESS today 11.    Presenting history -  6/5/2024- seen in April for sleep evaluation.   Had PSG/MSLT ordered but only at 37 minutes of sleep time.  He said this was inexplicable to him.  He never has trouble sleeping.  He has a routine bedtime of 930 to 10 PM.  He falls asleep within minutes.  He wakes at 7 AM with an alarm.  He does not feel refreshed and restored.  He rarely naps during the day.  He has a remote history of sleep paralysis, last episode was more than 2 years ago.  He tosses and turns in his sleep.  He has witnessed sleep apneas.  He has a remote sleep study that showed mild sleep apnea.         6/5/2024    12:34 PM 6/4/2024     9:48 AM 4/19/2024     8:55 AM 4/19/2022     2:22 PM 11/6/2018     9:31 AM   Sleep Medicine   Sitting and reading 3 3 3 3 3   Watching TV 3 3 3 3 3   Sitting, inactive in a public place (e.g. a theatre or a meeting) 1 1 2 1 1   As a passenger in a car for an hour without a break 0 0 0 2 1   Lying down to rest in the afternoon when circumstances permit 2 2 3 3 3   Sitting and talking to someone 1 1 1 1 0   Sitting quietly after a lunch without alcohol 1 1 3 1 2   In a car, while stopped for a few minutes in traffic 0 0 0 0 0   [place] Sleepiness Score 11 11 15 14 13   Neck (Inches) 14  15  15        Current Outpatient Medications   Medication Sig Dispense Refill    zolpidem (AMBIEN) 5 MG tablet Take 1 tablet by mouth nightly as needed for Sleep for up to 2 days. Take to the sleep center for night of sleep study. Take 1 pill ( 5mg).  If not asleep in 30-45 minutes repeat dose. Max Daily Amount: 5 mg 2 tablet 0     No current facility-administered medications for

## 2024-08-02 ENCOUNTER — TELEPHONE (OUTPATIENT)
Dept: PULMONOLOGY | Age: 34
End: 2024-08-02

## 2024-08-02 NOTE — TELEPHONE ENCOUNTER
Charli with [facility] called and stated the repeat sleep test was denied due to following reasons: no hx of lung or heart disease, patient has not lost significant amount of weight.   Ref #:[id number]  Peer to Peer can be done. [phone]  Please advise.

## 2024-08-02 NOTE — TELEPHONE ENCOUNTER
The phone number listed below is a [facility] number.   Called correct number of [phone].  Peer to peer completed. Approved for in lab study from 7/31/24-9/28/24.  Please let sleep center and Paresh know.

## 2024-08-02 NOTE — TELEPHONE ENCOUNTER
LVM informing pt and also told him to call sleep center at [phone] and also messaged the sleep center as well.

## 2024-09-19 ENCOUNTER — TELEPHONE (OUTPATIENT)
Dept: PULMONOLOGY | Age: 34
End: 2024-09-19

## 2024-11-08 ENCOUNTER — TELEPHONE (OUTPATIENT)
Dept: FAMILY MEDICINE CLINIC | Age: 34
End: 2024-11-08

## 2024-11-08 NOTE — TELEPHONE ENCOUNTER
Pt called, says his psychiatrist has recommended he see a neurologist, he is having episodes when he gets tunnel vision and smells something really intently and then his heart rate increases rapidly,and he disassociates while its going on, it is happening multiple times a day. He would like a referral and they told him his PCP would give that to him. He does not have a certain neurologist in mind, he would also like to know if one can be recommended. Please advise.

## 2024-11-13 SDOH — ECONOMIC STABILITY: INCOME INSECURITY: HOW HARD IS IT FOR YOU TO PAY FOR THE VERY BASICS LIKE FOOD, HOUSING, MEDICAL CARE, AND HEATING?: HARD

## 2024-11-13 SDOH — ECONOMIC STABILITY: FOOD INSECURITY: WITHIN THE PAST 12 MONTHS, THE FOOD YOU BOUGHT JUST DIDN'T LAST AND YOU DIDN'T HAVE MONEY TO GET MORE.: SOMETIMES TRUE

## 2024-11-13 SDOH — ECONOMIC STABILITY: FOOD INSECURITY: WITHIN THE PAST 12 MONTHS, YOU WORRIED THAT YOUR FOOD WOULD RUN OUT BEFORE YOU GOT MONEY TO BUY MORE.: OFTEN TRUE

## 2024-11-13 ASSESSMENT — PATIENT HEALTH QUESTIONNAIRE - PHQ9
2. FEELING DOWN, DEPRESSED OR HOPELESS: MORE THAN HALF THE DAYS
6. FEELING BAD ABOUT YOURSELF - OR THAT YOU ARE A FAILURE OR HAVE LET YOURSELF OR YOUR FAMILY DOWN: SEVERAL DAYS
3. TROUBLE FALLING OR STAYING ASLEEP: NOT AT ALL
SUM OF ALL RESPONSES TO PHQ QUESTIONS 1-9: 7
4. FEELING TIRED OR HAVING LITTLE ENERGY: SEVERAL DAYS
SUM OF ALL RESPONSES TO PHQ QUESTIONS 1-9: 7
1. LITTLE INTEREST OR PLEASURE IN DOING THINGS: MORE THAN HALF THE DAYS
SUM OF ALL RESPONSES TO PHQ QUESTIONS 1-9: 7
4. FEELING TIRED OR HAVING LITTLE ENERGY: SEVERAL DAYS
SUM OF ALL RESPONSES TO PHQ QUESTIONS 1-9: 7
8. MOVING OR SPEAKING SO SLOWLY THAT OTHER PEOPLE COULD HAVE NOTICED. OR THE OPPOSITE - BEING SO FIDGETY OR RESTLESS THAT YOU HAVE BEEN MOVING AROUND A LOT MORE THAN USUAL: NOT AT ALL
8. MOVING OR SPEAKING SO SLOWLY THAT OTHER PEOPLE COULD HAVE NOTICED. OR THE OPPOSITE, BEING SO FIGETY OR RESTLESS THAT YOU HAVE BEEN MOVING AROUND A LOT MORE THAN USUAL: NOT AT ALL
9. THOUGHTS THAT YOU WOULD BE BETTER OFF DEAD, OR OF HURTING YOURSELF: NOT AT ALL
6. FEELING BAD ABOUT YOURSELF - OR THAT YOU ARE A FAILURE OR HAVE LET YOURSELF OR YOUR FAMILY DOWN: SEVERAL DAYS
10. IF YOU CHECKED OFF ANY PROBLEMS, HOW DIFFICULT HAVE THESE PROBLEMS MADE IT FOR YOU TO DO YOUR WORK, TAKE CARE OF THINGS AT HOME, OR GET ALONG WITH OTHER PEOPLE: SOMEWHAT DIFFICULT
3. TROUBLE FALLING OR STAYING ASLEEP: NOT AT ALL
2. FEELING DOWN, DEPRESSED OR HOPELESS: MORE THAN HALF THE DAYS
SUM OF ALL RESPONSES TO PHQ9 QUESTIONS 1 & 2: 4
7. TROUBLE CONCENTRATING ON THINGS, SUCH AS READING THE NEWSPAPER OR WATCHING TELEVISION: NOT AT ALL
5. POOR APPETITE OR OVEREATING: SEVERAL DAYS
1. LITTLE INTEREST OR PLEASURE IN DOING THINGS: MORE THAN HALF THE DAYS
5. POOR APPETITE OR OVEREATING: SEVERAL DAYS
9. THOUGHTS THAT YOU WOULD BE BETTER OFF DEAD, OR OF HURTING YOURSELF: NOT AT ALL
7. TROUBLE CONCENTRATING ON THINGS, SUCH AS READING THE NEWSPAPER OR WATCHING TELEVISION: NOT AT ALL
SUM OF ALL RESPONSES TO PHQ QUESTIONS 1-9: 7
10. IF YOU CHECKED OFF ANY PROBLEMS, HOW DIFFICULT HAVE THESE PROBLEMS MADE IT FOR YOU TO DO YOUR WORK, TAKE CARE OF THINGS AT HOME, OR GET ALONG WITH OTHER PEOPLE: SOMEWHAT DIFFICULT

## 2024-11-14 ENCOUNTER — OFFICE VISIT (OUTPATIENT)
Dept: FAMILY MEDICINE CLINIC | Age: 34
End: 2024-11-14
Payer: COMMERCIAL

## 2024-11-14 VITALS
HEIGHT: 73 IN | SYSTOLIC BLOOD PRESSURE: 118 MMHG | OXYGEN SATURATION: 98 % | HEART RATE: 91 BPM | DIASTOLIC BLOOD PRESSURE: 60 MMHG | WEIGHT: 167 LBS | BODY MASS INDEX: 22.13 KG/M2

## 2024-11-14 DIAGNOSIS — R40.4 NONSPECIFIC PAROXYSMAL SPELL: Primary | ICD-10-CM

## 2024-11-14 PROCEDURE — 99214 OFFICE O/P EST MOD 30 MIN: CPT | Performed by: FAMILY MEDICINE

## 2024-11-14 RX ORDER — LAMOTRIGINE 100 MG/1
100 TABLET ORAL DAILY
COMMUNITY
Start: 2024-09-25

## 2024-11-14 NOTE — PROGRESS NOTES
Paresh Vargas (:  1990) is a 34 y.o. male,Established patient, here for evaluation of the following chief complaint(s):  Other (Needs referral for neurology )         Assessment & Plan  Nonspecific paroxysmal spell    Concern for partial complex seizures.    Orders:    EEG awake and drowsy; Future    MRI BRAIN W WO CONTRAST; Future    Barby Fernandez MD, Neurology, [facility]      No follow-ups on file.       Subjective   Paresh Vargas is a 34 y.o. male. Patient presents with:  Other: Needs referral for neurology       Mr. Vargas is a 33 yo male having episodes of tunnel vision, and smells something really intently and then his heart rate increases rapidly. He disassociates while its going on.  It is happening multiple times a day.  Patient notes that he has been feeling out of it for some time after these.  Patient notes he is under a lot of stress recently.  The patients PMH, surgical history, family history, medications, allergies were all reviewed and updated as appropriate today.        Review of Systems       Objective   Physical Exam  Vitals and nursing note reviewed.   Constitutional:       Appearance: He is well-developed.   HENT:      Head: Normocephalic and atraumatic.      Right Ear: External ear normal.      Left Ear: External ear normal.      Nose: Nose normal.   Eyes:      Conjunctiva/sclera: Conjunctivae normal.      Pupils: Pupils are equal, round, and reactive to light.   Cardiovascular:      Rate and Rhythm: Normal rate and regular rhythm.      Heart sounds: Normal heart sounds. No murmur heard.     No friction rub. No gallop.   Pulmonary:      Effort: Pulmonary effort is normal. No respiratory distress.      Breath sounds: Normal breath sounds. No wheezing.   Abdominal:      General: Bowel sounds are normal. There is no distension.      Palpations: Abdomen is soft.      Tenderness: There is no abdominal tenderness.   Musculoskeletal:         General: No tenderness. Normal

## 2024-11-15 ENCOUNTER — TELEPHONE (OUTPATIENT)
Dept: FAMILY MEDICINE CLINIC | Age: 34
End: 2024-11-15

## 2024-11-15 NOTE — TELEPHONE ENCOUNTER
Jaimie from [facility] called to notify provider that pt's MRI of Brain scan before and after contrast scheduled at [facility] and approved 11/14/24-12/13/2024 Auth #[id number]

## 2024-11-22 ENCOUNTER — HOSPITAL ENCOUNTER (OUTPATIENT)
Dept: NEUROLOGY | Age: 34
Discharge: HOME | End: 2024-11-22
Payer: COMMERCIAL

## 2024-11-22 DIAGNOSIS — R40.4 NONSPECIFIC PAROXYSMAL SPELL: ICD-10-CM

## 2024-11-22 PROCEDURE — 95819 EEG AWAKE AND ASLEEP: CPT

## 2024-11-22 NOTE — PROGRESS NOTES
EEG completed and available for interpretation on the [employer] database .    Pt became very tearful and anxious during his (r) EEG, test was not paused he was able to complete 25mins instead of the 60mins that was ordered. Pt became even more tearful during the photic stimuli

## 2024-12-03 ENCOUNTER — APPOINTMENT (OUTPATIENT)
Dept: MRI IMAGING | Age: 34
End: 2024-12-03
Payer: COMMERCIAL

## 2024-12-05 ENCOUNTER — TELEPHONE (OUTPATIENT)
Dept: FAMILY MEDICINE CLINIC | Age: 34
End: 2024-12-05

## 2024-12-05 DIAGNOSIS — F41.0 PANIC ATTACKS: Primary | ICD-10-CM

## 2024-12-05 RX ORDER — DIAZEPAM 5 MG/1
5 TABLET ORAL ONCE
Qty: 1 TABLET | Refills: 0 | Status: SHIPPED | OUTPATIENT
Start: 2024-12-05 | End: 2024-12-05

## 2024-12-05 NOTE — TELEPHONE ENCOUNTER
Pt had a MRI but could not finish pt  had a panic attack  Samir told him to call his PCP and ask for a ant-anxiety pill before the next MRI so he can complete  the testing his next MRI is 12/17/24

## 2025-01-02 ENCOUNTER — HOSPITAL ENCOUNTER (OUTPATIENT)
Dept: MRI IMAGING | Age: 35
Discharge: HOME | End: 2025-01-02
Payer: COMMERCIAL

## 2025-01-02 DIAGNOSIS — R40.4 NONSPECIFIC PAROXYSMAL SPELL: ICD-10-CM

## 2025-01-02 PROCEDURE — 6360000004 HC RX CONTRAST MEDICATION: Performed by: FAMILY MEDICINE

## 2025-01-02 PROCEDURE — A9576 INJ PROHANCE MULTIPACK: HCPCS | Performed by: FAMILY MEDICINE

## 2025-01-02 PROCEDURE — 70553 MRI BRAIN STEM W/O & W/DYE: CPT

## 2025-01-02 RX ADMIN — GADOTERIDOL 17 ML: 279.3 INJECTION, SOLUTION INTRAVENOUS at 17:55

## 2025-05-09 ENCOUNTER — OFFICE VISIT (OUTPATIENT)
Dept: FAMILY MEDICINE CLINIC | Age: 35
End: 2025-05-09
Payer: COMMERCIAL

## 2025-05-09 VITALS
SYSTOLIC BLOOD PRESSURE: 112 MMHG | BODY MASS INDEX: 21.42 KG/M2 | HEIGHT: 73 IN | OXYGEN SATURATION: 97 % | DIASTOLIC BLOOD PRESSURE: 64 MMHG | HEART RATE: 119 BPM | WEIGHT: 161.6 LBS

## 2025-05-09 DIAGNOSIS — K59.04 FUNCTIONAL CONSTIPATION: Primary | ICD-10-CM

## 2025-05-09 PROCEDURE — 99214 OFFICE O/P EST MOD 30 MIN: CPT | Performed by: STUDENT IN AN ORGANIZED HEALTH CARE EDUCATION/TRAINING PROGRAM

## 2025-05-09 SDOH — ECONOMIC STABILITY: FOOD INSECURITY: WITHIN THE PAST 12 MONTHS, YOU WORRIED THAT YOUR FOOD WOULD RUN OUT BEFORE YOU GOT MONEY TO BUY MORE.: OFTEN TRUE

## 2025-05-09 SDOH — ECONOMIC STABILITY: FOOD INSECURITY: WITHIN THE PAST 12 MONTHS, THE FOOD YOU BOUGHT JUST DIDN'T LAST AND YOU DIDN'T HAVE MONEY TO GET MORE.: SOMETIMES TRUE

## 2025-05-09 SDOH — ECONOMIC STABILITY: INCOME INSECURITY: IN THE LAST 12 MONTHS, WAS THERE A TIME WHEN YOU WERE NOT ABLE TO PAY THE MORTGAGE OR RENT ON TIME?: NO

## 2025-05-09 SDOH — ECONOMIC STABILITY: TRANSPORTATION INSECURITY
IN THE PAST 12 MONTHS, HAS THE LACK OF TRANSPORTATION KEPT YOU FROM MEDICAL APPOINTMENTS OR FROM GETTING MEDICATIONS?: NO

## 2025-05-09 ASSESSMENT — PATIENT HEALTH QUESTIONNAIRE - PHQ9
SUM OF ALL RESPONSES TO PHQ QUESTIONS 1-9: 13
1. LITTLE INTEREST OR PLEASURE IN DOING THINGS: MORE THAN HALF THE DAYS
3. TROUBLE FALLING OR STAYING ASLEEP: SEVERAL DAYS
9. THOUGHTS THAT YOU WOULD BE BETTER OFF DEAD, OR OF HURTING YOURSELF: NOT AT ALL
8. MOVING OR SPEAKING SO SLOWLY THAT OTHER PEOPLE COULD HAVE NOTICED. OR THE OPPOSITE, BEING SO FIGETY OR RESTLESS THAT YOU HAVE BEEN MOVING AROUND A LOT MORE THAN USUAL: NOT AT ALL
5. POOR APPETITE OR OVEREATING: NEARLY EVERY DAY
10. IF YOU CHECKED OFF ANY PROBLEMS, HOW DIFFICULT HAVE THESE PROBLEMS MADE IT FOR YOU TO DO YOUR WORK, TAKE CARE OF THINGS AT HOME, OR GET ALONG WITH OTHER PEOPLE: SOMEWHAT DIFFICULT
2. FEELING DOWN, DEPRESSED OR HOPELESS: MORE THAN HALF THE DAYS
SUM OF ALL RESPONSES TO PHQ QUESTIONS 1-9: 13
SUM OF ALL RESPONSES TO PHQ QUESTIONS 1-9: 13
3. TROUBLE FALLING OR STAYING ASLEEP: SEVERAL DAYS
7. TROUBLE CONCENTRATING ON THINGS, SUCH AS READING THE NEWSPAPER OR WATCHING TELEVISION: MORE THAN HALF THE DAYS
2. FEELING DOWN, DEPRESSED OR HOPELESS: MORE THAN HALF THE DAYS
6. FEELING BAD ABOUT YOURSELF - OR THAT YOU ARE A FAILURE OR HAVE LET YOURSELF OR YOUR FAMILY DOWN: MORE THAN HALF THE DAYS
1. LITTLE INTEREST OR PLEASURE IN DOING THINGS: MORE THAN HALF THE DAYS
SUM OF ALL RESPONSES TO PHQ QUESTIONS 1-9: 13
8. MOVING OR SPEAKING SO SLOWLY THAT OTHER PEOPLE COULD HAVE NOTICED. OR THE OPPOSITE - BEING SO FIDGETY OR RESTLESS THAT YOU HAVE BEEN MOVING AROUND A LOT MORE THAN USUAL: NOT AT ALL
4. FEELING TIRED OR HAVING LITTLE ENERGY: SEVERAL DAYS
SUM OF ALL RESPONSES TO PHQ QUESTIONS 1-9: 13
6. FEELING BAD ABOUT YOURSELF - OR THAT YOU ARE A FAILURE OR HAVE LET YOURSELF OR YOUR FAMILY DOWN: MORE THAN HALF THE DAYS
10. IF YOU CHECKED OFF ANY PROBLEMS, HOW DIFFICULT HAVE THESE PROBLEMS MADE IT FOR YOU TO DO YOUR WORK, TAKE CARE OF THINGS AT HOME, OR GET ALONG WITH OTHER PEOPLE: SOMEWHAT DIFFICULT
4. FEELING TIRED OR HAVING LITTLE ENERGY: SEVERAL DAYS
5. POOR APPETITE OR OVEREATING: NEARLY EVERY DAY
9. THOUGHTS THAT YOU WOULD BE BETTER OFF DEAD, OR OF HURTING YOURSELF: NOT AT ALL
7. TROUBLE CONCENTRATING ON THINGS, SUCH AS READING THE NEWSPAPER OR WATCHING TELEVISION: MORE THAN HALF THE DAYS

## 2025-05-09 NOTE — PROGRESS NOTES
Paresh Vargas (:  1990) is a 34 y.o. male,Established patient, here for evaluation of the following chief complaint(s):  Constipation ( intermitten constipation for a couple months, causing pain/)         Assessment & Plan  Functional constipation    Patient with constipation, without warning signs.  Utilize shared decision making, we will move forward with MiraLAX daily.  Patient will titrate based on stool results.  Patient will use enema, magnesium, and stool softener as needed in addition to the MiraLAX.  Patient will let us know if this regimen is not working.           No follow-ups on file.       Subjective   HPI  Patient is a 34-year-old male, who presents to clinic to discuss constipation.  Patient reports that his constipation is intermittent and sometimes he has bowel movements every 2 to 3 days and sometimes it can go up to 2 weeks.  He states that this has been a problem specifically over the last 3 months.  Patient states that he will have abdominal pain then will sometimes have diarrhea but will not be able to have a bowel movement.  Often when he has a bowel movement, the BMs are more firm and pebble-like.  No blood in the stool, no problems with hemorrhoids, has tried MiraLAX and fiber intermittently before but has never been on a regular regimen.  He has also utilized an enema in the past.  Patient states that he did have a bowel movement last night but still feels like he is full of stool.  He does state that it affects his appetite.    Review of Systems   All other systems reviewed and are negative.         Objective   Vitals:    25 1246   BP: 112/64   Pulse: (!) 119   SpO2: 97%   Weight: 73.3 kg (161 lb 9.6 oz)   Height: 1.854 m (6' 1\")       Physical Exam  Vitals reviewed.   Constitutional:       General: He is not in acute distress.     Appearance: Normal appearance. He is not ill-appearing, toxic-appearing or diaphoretic.   HENT:      Mouth/Throat:      Mouth: Mucous